# Patient Record
Sex: MALE | Race: WHITE | NOT HISPANIC OR LATINO | URBAN - METROPOLITAN AREA
[De-identification: names, ages, dates, MRNs, and addresses within clinical notes are randomized per-mention and may not be internally consistent; named-entity substitution may affect disease eponyms.]

---

## 2023-01-05 NOTE — H&P CARDIOLOGY - NSICDXPASTMEDICALHX_GEN_ALL_CORE_FT
PAST MEDICAL HISTORY:  CAD (coronary artery disease)     HLD (hyperlipidemia)     HTN (hypertension)     Paroxysmal A-fib      PAST MEDICAL HISTORY:  CAD (coronary artery disease)     Diabetes     DM (diabetes mellitus)     HLD (hyperlipidemia)     HTN (hypertension)     Paroxysmal A-fib

## 2023-01-05 NOTE — H&P CARDIOLOGY - HISTORY OF PRESENT ILLNESS
Patient is a 72 male with PMHx HLD, HTN, paroxysmal Afib (clarify anticoag), CAD (MARTHA to RCA 7/13/12) presents to cardiologist office with c/o CP with walking. CCTA 11/27/22 performed in [place], calcium score 509.  Patient presents today for [facility] with possible intervention.     Pre cath note:    indication:  [ ] STEMI                [ ] NSTEMI                 [ ] Acute coronary syndrome                     [ ]Unstable Angina   [ ] high risk  [ ] intermediate risk  [ ] low risk                     [x ] Stable Angina     non-invasive testing:              CCTA         Date:     11/27                result: [ ] high risk  [ ] intermediate risk  [ ] low risk    Anti- Anginal medications:                    [ ] not used                       [ ] used                   [ ] not used but strong indication not to use    Ejection Fraction                   [ ] <29            [ ] 30-39%   [ ] 40-49%     [ ]>50%    CHF                   [ ] active (within last 14 days on meds   [ ] Chronic (on meds but no exacerbation)    COPD                   [ ] mild (on chronic bronchodilators)  [ ] moderate (on chronic steroid therapy)      [ ] severe (indication for home O2 or PACO2 >50)    Other risk factors:                       [ ] Previous MI                     [ ] CVA/ stroke                    [ ] carotid stent/ CEA                    [ ] PVD/PAD- (arterial aneurysm, non-palpable pulses, tortuous vessel with inability to insert catheter, infra-renal dissection, renal or subclavian artery stenosis)                    [ ] diabetic                    [ ] previous CABG                    [ ] Renal Failure           Right Mohan Test:    Adjusted Cath Bleeding Risk: 1.0%    Pre-hydration: Patient is a 72 male with PMHx HLD, HTN, paroxysmal Afib now SR not on AC, DM, CAD (MARTHA to RCA 7/13/12) presents to cardiologist office with c/o CP with walking. CCTA 11/27/22 performed in [place], calcium score 509.  Patient presents today for [facility] with possible intervention.     Pre cath note:    indication:  [ ] STEMI                [ ] NSTEMI                 [ ] Acute coronary syndrome                     [ ]Unstable Angina   [ ] high risk  [ ] intermediate risk  [ ] low risk                     [x ] Stable Angina     non-invasive testing:              CCTA         Date:     11/27/22               result: [x ] high risk  [ ] intermediate risk  [ ] low risk    Anti- Anginal medications:                    [ ] not used                       [ x] used                   [ ] not used but strong indication not to use    Ejection Fraction                   [ ] <29            [ ] 30-39%   [ ] 40-49%     [ ]>50%    CHF                   [ ] active (within last 14 days on meds   [ ] Chronic (on meds but no exacerbation)    COPD                   [ ] mild (on chronic bronchodilators)  [ ] moderate (on chronic steroid therapy)      [ ] severe (indication for home O2 or PACO2 >50)    Other risk factors:                       [ ] Previous MI                     [ ] CVA/ stroke                    [ ] carotid stent/ CEA                    [ ] PVD/PAD- (arterial aneurysm, non-palpable pulses, tortuous vessel with inability to insert catheter, infra-renal dissection, renal or subclavian artery stenosis)                    [x ] diabetic                    [ ] previous CABG                    [ ] Renal Failure           Right Mohan Test: WNL    Adjusted Cath Bleeding Risk: 1.0%    Pre-hydration: 250cc NS bolus over 1 hr precath

## 2023-01-06 ENCOUNTER — OUTPATIENT (OUTPATIENT)
Dept: OUTPATIENT SERVICES | Facility: HOSPITAL | Age: 73
LOS: 1 days | Discharge: HOME | End: 2023-01-06
Payer: COMMERCIAL

## 2023-01-06 DIAGNOSIS — Z95.5 PRESENCE OF CORONARY ANGIOPLASTY IMPLANT AND GRAFT: Chronic | ICD-10-CM

## 2023-01-06 LAB
ANION GAP SERPL CALC-SCNC: 11 MMOL/L — SIGNIFICANT CHANGE UP (ref 7–14)
BUN SERPL-MCNC: 14 MG/DL — SIGNIFICANT CHANGE UP (ref 10–20)
CALCIUM SERPL-MCNC: 10.1 MG/DL — SIGNIFICANT CHANGE UP (ref 8.4–10.5)
CHLORIDE SERPL-SCNC: 102 MMOL/L — SIGNIFICANT CHANGE UP (ref 98–110)
CO2 SERPL-SCNC: 26 MMOL/L — SIGNIFICANT CHANGE UP (ref 17–32)
CREAT SERPL-MCNC: 1 MG/DL — SIGNIFICANT CHANGE UP (ref 0.7–1.5)
EGFR: 81 ML/MIN/1.73M2 — SIGNIFICANT CHANGE UP
GLUCOSE BLDC GLUCOMTR-MCNC: 105 MG/DL — HIGH (ref 70–99)
GLUCOSE SERPL-MCNC: 112 MG/DL — HIGH (ref 70–99)
HCT VFR BLD CALC: 48.6 % — SIGNIFICANT CHANGE UP (ref 42–52)
HGB BLD-MCNC: 16.4 G/DL — SIGNIFICANT CHANGE UP (ref 14–18)
MCHC RBC-ENTMCNC: 29.3 PG — SIGNIFICANT CHANGE UP (ref 27–31)
MCHC RBC-ENTMCNC: 33.7 G/DL — SIGNIFICANT CHANGE UP (ref 32–37)
MCV RBC AUTO: 86.9 FL — SIGNIFICANT CHANGE UP (ref 80–94)
NRBC # BLD: 0 /100 WBCS — SIGNIFICANT CHANGE UP (ref 0–0)
PLATELET # BLD AUTO: 179 K/UL — SIGNIFICANT CHANGE UP (ref 130–400)
POTASSIUM SERPL-MCNC: 5.2 MMOL/L — HIGH (ref 3.5–5)
POTASSIUM SERPL-SCNC: 5.2 MMOL/L — HIGH (ref 3.5–5)
RBC # BLD: 5.59 M/UL — SIGNIFICANT CHANGE UP (ref 4.7–6.1)
RBC # FLD: 12.4 % — SIGNIFICANT CHANGE UP (ref 11.5–14.5)
SODIUM SERPL-SCNC: 139 MMOL/L — SIGNIFICANT CHANGE UP (ref 135–146)
WBC # BLD: 6.26 K/UL — SIGNIFICANT CHANGE UP (ref 4.8–10.8)
WBC # FLD AUTO: 6.26 K/UL — SIGNIFICANT CHANGE UP (ref 4.8–10.8)

## 2023-01-06 PROCEDURE — 93458 L HRT ARTERY/VENTRICLE ANGIO: CPT | Mod: 26,XU

## 2023-01-06 PROCEDURE — 92978 ENDOLUMINL IVUS OCT C 1ST: CPT | Mod: 26,RC

## 2023-01-06 PROCEDURE — 92928 PRQ TCAT PLMT NTRAC ST 1 LES: CPT | Mod: RC

## 2023-01-06 RX ORDER — CLOPIDOGREL BISULFATE 75 MG/1
1 TABLET, FILM COATED ORAL
Qty: 30 | Refills: 3
Start: 2023-01-06 | End: 2023-05-05

## 2023-01-06 RX ORDER — SODIUM CHLORIDE 9 MG/ML
1000 INJECTION INTRAMUSCULAR; INTRAVENOUS; SUBCUTANEOUS
Refills: 0 | Status: DISCONTINUED | OUTPATIENT
Start: 2023-01-06 | End: 2023-01-20

## 2023-01-06 NOTE — ASU PATIENT PROFILE, ADULT - FALL HARM RISK - UNIVERSAL INTERVENTIONS
Statement Selected Bed in lowest position, wheels locked, appropriate side rails in place/Call bell, personal items and telephone in reach/Instruct patient to call for assistance before getting out of bed or chair/Non-slip footwear when patient is out of bed/[place] to call system/Physically safe environment - no spills, clutter or unnecessary equipment/Purposeful Proactive Rounding/Room/bathroom lighting operational, light cord in reach

## 2023-01-06 NOTE — ASU PATIENT PROFILE, ADULT - NSICDXPASTMEDICALHX_GEN_ALL_CORE_FT
PAST MEDICAL HISTORY:  CAD (coronary artery disease)     Diabetes     HLD (hyperlipidemia)     HTN (hypertension)     Paroxysmal A-fib

## 2023-01-06 NOTE — PROGRESS NOTE ADULT - SUBJECTIVE AND OBJECTIVE BOX
Cardiology Follow up s/p PCI MARTHA    RAHUL VICTORIA   72y Male  PAST MEDICAL & SURGICAL HISTORY:    HLD (hyperlipidemia)    HTN (hypertension)    Paroxysmal A-fib    CAD (coronary artery disease)    Diabetes    DM (diabetes mellitus)    S/P right coronary artery (RCA) stent placement         HPI:  Patient is a 72 male with PMHx HLD, HTN, paroxysmal Afib now SR not on AC, DM, CAD (MARTHA to RCA 7/13/12) presents to cardiologist office with c/o CP with walking. CCTA 11/27/22 performed in [place], calcium score 509.  Patient presents today for [facility] with possible intervention.     Allergies    No Known Allergies      HR: 56  BP: 128/75  RR: 12  SpO2: 98% on RA       MEDICATIONS  (STANDING):  sodium chloride 0.9%. 1000 milliLiter(s) (150 mL/Hr) IV Continuous <Continuous>    REVIEW OF SYSTEMS:          All negative except as mentioned in HPI    PHYSICAL EXAM:           CONSTITUTIONAL: Well-developed; well-nourished; in no acute distress  	SKIN: warm, dry  	HEAD: Normocephalic; atraumatic  	EYES: PERRL.  	ENT: No nasal discharge, airway clear, mucous membranes moist  	NECK: Supple; non tender.  	CARD: +S1, +S2, no murmurs, gallops, or rubs. Regular rate and rhythm    	RESP: No wheezes, rales or rhonchi. CTA B/L  	ABD: soft ntnd, + BS x 4 quadrants  	EXT: moves all extremities,  no clubbing, cyanosis or edema  	NEURO: Alert and oriented x3, no focal deficits          PSYCH: Cooperative, appropriate          VASCULAR:  + Rad / + PTs / +  DPs          EXTREMITY:              Right Radial: Dressing D/C/I, access site soft, no hematoma, no pain, + pulses, no sign of infection, no numbness            ECG:   P    LABS:                        16.4   6.26  )-----------( 179      ( 06 Jan 2023 10:50 )             48.6     01-06    139  |  102  |  14  ----------------------------<  112<H>  5.2<H>   |  26  |  1.0    Ca    10.1      06 Jan 2023 10:50    A/P:  I discussed the case with Cardiologist Dr. Khan and recommend the following:    S/P PCI:   IV Contrast: 150 mL      Intervention: successful balloon angioplasty and DESx1 to ostial RPDA; IVUS     Implants: [place] 2.5x12 (AUC 7)     FINDINGS:     Coronary Dominance: Right     LM: Normal     LAD: mild disease   D1: mild disease     Prox Cx: 80% ostial stenosis   Distal Cx: moderate disease   OM1: mild disease     Prox RCA: mild disease  Mid RCA: mild disease  Distal RCA: mild disease with patent stent  RPDA: 90% ostial stenosis      LVEDP: 16mmHg     EF: 40%                         CBC/ECG at 5 pm                    NS 150cc/hr x 6hr                   Hold Metformin for 48 hr after Cardiac Cath   	     Continue DAPT ( Aspirin 81 mg daily and Plavix 75 mg daily ), ACEi, B-Blocker, Statin Therapy                   Patient given 30 day supply of ( Aspirin 81 mg daily and Plavix 75 mg daily ) to take at home                   Patient agreeing to take DAPT for at least one year or as directed by cardiologist                    Pt given instructions on importance of taking antiplatelet medication or risk acute stent thrombosis/death                   Post cath instructions, access site care and activity restrictions reviewed with patient                     Discussed with patient to return to hospital if experience chest pain, shortness breath, dizziness and site bleeding                   Aggressive risk factor modification, diet counseling, smoking cessation discussed with patient                       Can discharge patient from cardiac standpoint at 7 pm after ambulating without symptoms and access site wnl, ECG and blood work reviewed                    Benefits of Cardiac Rehab discussed with patient, All documents sent to [facility]. Patient instructed to call and make first                               appointment after first f/u visit with Cardiologist                    Stage PCI to ostial Cx in 4-6 weeks                    Follow up with Cardiology Dr. Khan in two weeks. Instructed to call and make an appointment

## 2023-01-06 NOTE — CHART NOTE - NSCHARTNOTEFT_GEN_A_CORE
PRE-OP DIAGNOSIS:  (+) CCTA    PROCEDURE:     [x] Coronary Angiogram     [x] LHC     [] LVG     [] RHC     [x] Intervention (see below)         PHYSICIAN: Dr. Khan     ASSISTANT: Dr. YI Coronado        PROCEDURE DESCRIPTION:     Consent:      [x] Patient     [] Family Member     []  Used        Anesthesia:     [] General     [x Sedation     [x] Local        Access & Closure:     [x] 6Fr Right Radial Artery; D-STAT     [] Fr Femoral Artery     [] Fr Femoral Vein     [] Fr Brachial Vein       IV Contrast: mL        Intervention:       Implants:        FINDINGS:     Coronary Dominance: Right       LM: Normal     LAD: Normal     CX: Normal     RCA: patent stent     LVEDP: mmHg     EF: %        ESTIMATED BLOOD LOSS: < 10 mL        CONDITION:     [x] Good     [] Fair     [] Critical        SPECIMEN REMOVED: N/A       POST-OP DIAGNOSIS:      [] Normal Coronary Angiogram     [] Mild Coronary Artery Disease (< 50% stenosis)     [] __ Vessel Coronary Artery Disease        PLAN OF CARE:     [x] D/C Home Today     [] Return to In-patient bed     [] Admit for observation     [] Return for Staged Procedure     [] CT Surgery Consult     [] Medications:   - c/w ASA  - start plavix   - c/w metoporlol and atorvasattin   - c/w home meds    [] IV Fluids:   - start NS@75 for 4 hours. ************INCOMPLETE NOTE**********************        PRE-OP DIAGNOSIS:  (+) CCTA    PROCEDURE:     [x] Coronary Angiogram     [x] LHC     [] LVG     [] RHC     [x] Intervention (see below)         PHYSICIAN: Dr. Khan     ASSISTANT: Dr. YI Coronado        PROCEDURE DESCRIPTION:     Consent:      [x] Patient     [] Family Member     []  Used        Anesthesia:     [] General     [x Sedation     [x] Local     ************INCOMPLETE NOTE*********************     Access & Closure:     [x] 6Fr Right Radial Artery; D-STAT     [] Fr Femoral Artery     [] Fr Femoral Vein     [] Fr Brachial Vein       IV Contrast: mL        Intervention:       Implants:        FINDINGS:     Coronary Dominance: Right       LM: Normal     LAD: Normal     CX: Normal     RCA: patent stent     LVEDP: mmHg     EF: %        ESTIMATED BLOOD LOSS: < 10 mL        CONDITION:     [x] Good     [] Fair     [] Critical        SPECIMEN REMOVED: N/A       POST-OP DIAGNOSIS:      [] Normal Coronary Angiogram     [] Mild Coronary Artery Disease (< 50% stenosis)     [] __ Vessel Coronary Artery Disease        PLAN OF CARE:     [x] D/C Home Today     [] Return to In-patient bed     [] Admit for observation     [] Return for Staged Procedure     [] CT Surgery Consult     [] Medications:   - c/w ASA  - start plavix   - c/w metoporlol and atorvasattin   - c/w home meds    [] IV Fluids:   - start NS@75 for 4 hours.='    ************INCOMPLETE NOTE********************* PRE-OP DIAGNOSIS:  (+) CCTA    PROCEDURE:     [x] Coronary Angiogram     [x] LHC     [] LVG     [] RHC     [x] Intervention (see below)         PHYSICIAN: Dr. Khan     ASSISTANT: Dr. Franz, Dr. YI Coronado        PROCEDURE DESCRIPTION:     Consent:      [x] Patient     [] Family Member     []  Used        Anesthesia:     [] General     [x Sedation     [x] Local     Access & Closure:     [x] 6Fr Right Radial Artery; D-STAT     [] Fr Femoral Artery     [] Fr Femoral Vein     [] Fr Brachial Vein       IV Contrast: 150 mL        Intervention: successful balloon angioplasty and DESx1 to ostial RPDA       Implants: [place] 2.5x12 (AUC 7)       FINDINGS:     Coronary Dominance: Right       LM: Normal     LAD: mild disease   D1: mild disease     Prox Cx: 80% ostial stenosis   Distal Cx: moderate disease   OM1: mild disease     Prox RCA: mild disease  Mid RCA: mild disease  Distal RCA: mild disease with patent stent  RPDA: 90% ostial stenosis      LVEDP: 16mmHg     EF: 40%        ESTIMATED BLOOD LOSS: < 10 mL        CONDITION:     [x] Good     [] Fair     [] Critical        SPECIMEN REMOVED: N/A       POST-OP DIAGNOSIS:      [] Normal Coronary Angiogram     [] Mild Coronary Artery Disease (< 50% stenosis)     [x] _2_ Vessel Coronary Artery Disease (RPDA and ostial Cx)       PLAN OF CARE:     [x] D/C Home Today     [] Return to In-patient bed     [] Admit for observation     [] Return for Staged Procedure     [] CT Surgery Consult     [] Medications:   - c/w ASA  - start plavix   - c/w Metoprolol and Atorvastatin   - c/w home meds    [] IV Fluids:   - start NS@150 for 4 hours PRE-OP DIAGNOSIS:  (+) CCTA    PROCEDURE:     [x] Coronary Angiogram     [x] LHC     [] LVG     [] RHC     [x] Intervention (see below)         PHYSICIAN: Dr. Khan     ASSISTANT: Dr. Franz, Dr. YI Coronado        PROCEDURE DESCRIPTION:     Consent:      [x] Patient     [] Family Member     []  Used        Anesthesia:     [] General     [x Sedation     [x] Local     Access & Closure:     [x] 6Fr Right Radial Artery; D-STAT     [] Fr Femoral Artery     [] Fr Femoral Vein     [] Fr Brachial Vein       IV Contrast: 150 mL        Intervention: successful balloon angioplasty and DESx1 to ostial RPDA; IVUS       Implants: [place] 2.5x12 (AUC 7)       FINDINGS:     Coronary Dominance: Right       LM: Normal     LAD: mild disease   D1: mild disease     Prox Cx: 80% ostial stenosis   Distal Cx: moderate disease   OM1: mild disease     Prox RCA: mild disease  Mid RCA: mild disease  Distal RCA: mild disease with patent stent  RPDA: 90% ostial stenosis      LVEDP: 16mmHg     EF: 40%        ESTIMATED BLOOD LOSS: < 10 mL        CONDITION:     [x] Good     [] Fair     [] Critical        SPECIMEN REMOVED: N/A       POST-OP DIAGNOSIS:      [] Normal Coronary Angiogram     [] Mild Coronary Artery Disease (< 50% stenosis)     [x] _2_ Vessel Coronary Artery Disease (RPDA and ostial Cx)       PLAN OF CARE:     [x] D/C Home Today     [] Return to In-patient bed     [] Admit for observation     [] Return for Staged Procedure     [] CT Surgery Consult     [] Medications:   - c/w ASA  - start plavix   - c/w Metoprolol and Atorvastatin   - c/w home meds  - stage PCI to ostial Cx in 4-6 weeks     [] IV Fluids:   - start NS@150 for 4 hours PRE-OP DIAGNOSIS:  (+) CCTA    PROCEDURE:     [x] Coronary Angiogram     [x] LHC     [x] LVG     [] RHC     [x] Intervention (see below)         PHYSICIAN: Dr. Khan     ASSISTANT: Dr. Franz, Dr. YI Coronado        PROCEDURE DESCRIPTION:     Consent:      [x] Patient     [] Family Member     []  Used        Anesthesia:     [] General     [x Sedation     [x] Local     Access & Closure:     [x] 6Fr Right Radial Artery; D-STAT     [] Fr Femoral Artery     [] Fr Femoral Vein     [] Fr Brachial Vein       IV Contrast: 150 mL        Intervention: successful balloon angioplasty and DESx1 to ostial RPDA; IVUS       Implants: [place] 2.5x12 (AUC 7)       FINDINGS:     Coronary Dominance: Right       LM: Normal     LAD: mild disease   D1: mild disease     Prox Cx: 80% ostial stenosis   Distal Cx: moderate disease   OM1: mild disease     Prox RCA: mild disease  Mid RCA: mild disease  Distal RCA: mild disease with patent stent  RPDA: 90% ostial stenosis      LVEDP: 16mmHg     EF: 55%        ESTIMATED BLOOD LOSS: < 10 mL        CONDITION:     [x] Good     [] Fair     [] Critical        SPECIMEN REMOVED: N/A       POST-OP DIAGNOSIS:      [] Normal Coronary Angiogram     [] Mild Coronary Artery Disease (< 50% stenosis)     [x] _2_ Vessel Coronary Artery Disease (RPDA and ostial Cx)       PLAN OF CARE:     [x] D/C Home Today     [] Return to In-patient bed     [] Admit for observation     [] Return for Staged Procedure     [] CT Surgery Consult     [] Medications:   - c/w ASA  - start plavix   - c/w Metoprolol and Atorvastatin   - c/w home meds  - stage PCI to ostial Cx in 4-6 weeks     [] IV Fluids:   - start NS@150 for 4 hours PRE-OP DIAGNOSIS:  (+) CCTA    PROCEDURE:     [x] Coronary Angiogram     [x] LHC     [x] LVG     [] RHC     [x] Intervention (see below)         PHYSICIAN: Dr. Khan     ASSISTANT: Dr. Franz, Dr. YI Coronado        PROCEDURE DESCRIPTION:     Consent:      [x] Patient     [] Family Member     []  Used        Anesthesia:     [] General     [x Sedation     [x] Local     Access & Closure:     [x] 6Fr Right Radial Artery; D-STAT     [] Fr Femoral Artery     [] Fr Femoral Vein     [] Fr Brachial Vein       IV Contrast: 150 mL        Intervention: successful balloon angioplasty and DESx1 to ostial RPDA; IVUS       Implants: [place] 2.5x12 (AUC 7)       FINDINGS:     Coronary Dominance: Right       LM: Normal     LAD: mild disease   D1: mild disease     Prox Cx: 80% ostial stenosis   Distal Cx: moderate disease   OM1: mild disease     Prox RCA: mild disease  Mid RCA: mild disease  Distal RCA: mild disease   RPDA: 90% ostial stenosis    RPL: patent stent     LVEDP: 16mmHg     EF: 55%        ESTIMATED BLOOD LOSS: < 10 mL        CONDITION:     [x] Good     [] Fair     [] Critical        SPECIMEN REMOVED: N/A       POST-OP DIAGNOSIS:      [x] _2_ Vessel Coronary Artery Disease (RPDA and ostial Cx)       PLAN OF CARE:     [x] D/C Home Today        [] Medications:   - c/w ASA  - start plavix   - c/w Metoprolol and Atorvastatin   - c/w home meds  - stage PCI to ostial Cx in 4-6 weeks     [] IV Fluids:   - start NS@150 for 4 hours

## 2023-01-17 DIAGNOSIS — Z82.49 FAMILY HISTORY OF ISCHEMIC HEART DISEASE AND OTHER DISEASES OF THE CIRCULATORY SYSTEM: ICD-10-CM

## 2023-01-17 DIAGNOSIS — I10 ESSENTIAL (PRIMARY) HYPERTENSION: ICD-10-CM

## 2023-01-17 DIAGNOSIS — Z87.891 PERSONAL HISTORY OF NICOTINE DEPENDENCE: ICD-10-CM

## 2023-01-17 DIAGNOSIS — I20.8 OTHER FORMS OF ANGINA PECTORIS: ICD-10-CM

## 2023-01-17 DIAGNOSIS — Z95.5 PRESENCE OF CORONARY ANGIOPLASTY IMPLANT AND GRAFT: ICD-10-CM

## 2023-01-17 DIAGNOSIS — I48.0 PAROXYSMAL ATRIAL FIBRILLATION: ICD-10-CM

## 2023-01-17 DIAGNOSIS — Z79.82 LONG TERM (CURRENT) USE OF ASPIRIN: ICD-10-CM

## 2023-01-17 DIAGNOSIS — Z79.84 LONG TERM (CURRENT) USE OF ORAL HYPOGLYCEMIC DRUGS: ICD-10-CM

## 2023-01-17 DIAGNOSIS — E78.5 HYPERLIPIDEMIA, UNSPECIFIED: ICD-10-CM

## 2023-01-17 DIAGNOSIS — I25.118 ATHEROSCLEROTIC HEART DISEASE OF NATIVE CORONARY ARTERY WITH OTHER FORMS OF ANGINA PECTORIS: ICD-10-CM

## 2023-04-21 PROBLEM — E11.9 TYPE 2 DIABETES MELLITUS WITHOUT COMPLICATIONS: Chronic | Status: ACTIVE | Noted: 2023-01-06

## 2023-04-21 PROBLEM — E78.5 HYPERLIPIDEMIA, UNSPECIFIED: Chronic | Status: ACTIVE | Noted: 2023-01-05

## 2023-04-21 PROBLEM — I25.10 ATHEROSCLEROTIC HEART DISEASE OF NATIVE CORONARY ARTERY WITHOUT ANGINA PECTORIS: Chronic | Status: ACTIVE | Noted: 2023-01-05

## 2023-04-21 PROBLEM — I48.0 PAROXYSMAL ATRIAL FIBRILLATION: Chronic | Status: ACTIVE | Noted: 2023-01-05

## 2023-04-21 PROBLEM — I10 ESSENTIAL (PRIMARY) HYPERTENSION: Chronic | Status: ACTIVE | Noted: 2023-01-05

## 2023-04-27 VITALS — SYSTOLIC BLOOD PRESSURE: 142 MMHG | OXYGEN SATURATION: 96 % | HEART RATE: 54 BPM | DIASTOLIC BLOOD PRESSURE: 75 MMHG

## 2023-04-27 NOTE — H&P CARDIOLOGY - NSICDXPASTMEDICALHX_GEN_ALL_CORE_FT
PAST MEDICAL HISTORY:  CAD (coronary artery disease)     Diabetes     DM (diabetes mellitus)     HLD (hyperlipidemia)     HTN (hypertension)     Paroxysmal A-fib      PAST MEDICAL HISTORY:  2019 novel coronavirus disease (COVID-19)     CAD (coronary artery disease)     Diabetes     DM (diabetes mellitus)     HLD (hyperlipidemia)     HTN (hypertension)     Paroxysmal A-fib

## 2023-04-27 NOTE — H&P CARDIOLOGY - HISTORY OF PRESENT ILLNESS
71 y/o male with PMHx HLD, HTN, paroxysmal Afib (now SR, not on AC), DM-II, CAD with prior PCI/MARTHA to RCA 7/13/12 and recent PCI/MARTHA ostial RPDA on 1/6/23 (EF 55%), with residual pCx 80% stenosis, who now presents for recommended staged PCI of pCx.    Pt initially presented to cardiologist office with c/o CP with walking.  CCTA 11/27/22 performed in [place], calcium score 509.      Pre cath note:    indication:  [ ] STEMI                [ ] NSTEMI                 [ ] Acute coronary syndrome                     [ ]Unstable Angina   [ ] high risk  [ ] intermediate risk  [ ] low risk                     [ ] Stable Angina     non-invasive testing:                          Date:                     result: [ ] high risk  [ ] intermediate risk  [ ] low risk    Anti- Anginal medications:                    [ ] not used                       [ ] used                   [ ] not used but strong indication not to use    Ejection Fraction                   [ ] <29            [ ] 30-39%   [ ] 40-49%     [ ]>50%    CHF                   [ ] active (within last 14 days on meds   [ ] Chronic (on meds but no exacerbation)    COPD                   [ ] mild (on chronic bronchodilators)  [ ] moderate (on chronic steroid therapy)      [ ] severe (indication for home O2 or PACO2 >50)    Other risk factors:                       [ ] Previous MI                     [ ] CVA/ stroke                    [ ] carotid stent/ CEA                    [ ] PVD/PAD- (arterial aneurysm, non-palpable pulses, tortuous vessel with inability to insert catheter, infra-renal dissection, renal or subclavian artery stenosis)                    [x ] diabetic                    [ ] previous CABG                    [ ] Renal Failure       Right Mohan Test:    Adjusted Cath Bleeding Risk: 0.9%    Pre-hydration:      71 y/o male with PMHx HLD, HTN, paroxysmal Afib (now SR, not on AC), DM-II, CAD with prior PCI/MARTHA to RCA 7/13/12 and recent PCI/MARTHA ostial RPDA on 1/6/23 (EF 55%), with residual pCx 80% stenosis, who now presents for recommended staged PCI of pCx.    Pt initially presented to cardiologist office with c/o CP with walking.  CCTA 11/27/22 performed in [place], calcium score 509.      Pre cath note:    indication:  [ ] STEMI                [ ] NSTEMI                 [ ] Acute coronary syndrome                     [ ]Unstable Angina   [ ] high risk  [ ] intermediate risk  [ ] low risk                     [ x] Stable Angina     non-invasive testing:   CCTA                       Date:    11/27/22                 result: [ ] high risk  [ x] intermediate risk  [ ] low risk                   (x) staged PCI LCX  Anti- Anginal medications:                    [ ] not used                       [ ] used                   [ ] not used but strong indication not to use    Ejection Fraction                   [ ] <29            [ ] 30-39%   [ ] 40-49%     [x ]>50%    CHF                   [ ] active (within last 14 days on meds   [ ] Chronic (on meds but no exacerbation)    COPD                   [ ] mild (on chronic bronchodilators)  [ ] moderate (on chronic steroid therapy)      [ ] severe (indication for home O2 or PACO2 >50)    Other risk factors:                       [ ] Previous MI                     [ ] CVA/ stroke                    [ ] carotid stent/ CEA                    [ ] PVD/PAD- (arterial aneurysm, non-palpable pulses, tortuous vessel with inability to insert catheter, infra-renal dissection, renal or subclavian artery stenosis)                    [x ] diabetic                    [ ] previous CABG                    [ ] Renal Failure       Right Mohan Test:  Adjusted Cath Bleeding Risk: 0.9%  Pre-hydration: NS 250cc IV bolus x 1 over 1 hr  EKG:   EF: 55% on 1/6/23     73 y/o male with PMHx HLD, HTN, paroxysmal Afib (now SR, not on AC), DM-II, CAD( 3 stents total) with prior PCI/MARTHA to RCA 7/13/12 and recent PCI/MARTHA ostial RPDA on 1/6/23 (EF 55%), with residual pCx 80% stenosis, who now presents for recommended staged PCI of pCx.    Pt initially presented to cardiologist office with c/o CP with walking.  CCTA 11/27/22 performed in [place], calcium score 509. Pt still reports episodes of chest pain when ambulating quickly.     Pre cath note:    indication:  [ ] STEMI                [ ] NSTEMI                 [ ] Acute coronary syndrome                     [ ]Unstable Angina   [ ] high risk  [ ] intermediate risk  [ ] low risk                     [ x] Stable Angina     non-invasive testing:   CCTA                       Date:    11/27/22                 result: [ ] high risk  [ x] intermediate risk  [ ] low risk                   (x) staged PCI LCX  Anti- Anginal medications:                    [ ] not used                       [ x] used                   [ ] not used but strong indication not to use    Ejection Fraction                   [ ] <29            [ ] 30-39%   [ ] 40-49%     [x ]>50%    CHF                   [ ] active (within last 14 days on meds   [ ] Chronic (on meds but no exacerbation)    COPD                   [ ] mild (on chronic bronchodilators)  [ ] moderate (on chronic steroid therapy)      [ ] severe (indication for home O2 or PACO2 >50)    Other risk factors:                       [ ] Previous MI                     [ ] CVA/ stroke                    [ ] carotid stent/ CEA                    [ ] PVD/PAD- (arterial aneurysm, non-palpable pulses, tortuous vessel with inability to insert catheter, infra-renal dissection, renal or subclavian artery stenosis)                    [x ] diabetic                    [ ] previous CABG                    [ ] Renal Failure       Right Mohan Test: Positive   Adjusted Cath Bleeding Risk: 0.9%  Pre-hydration: NS 250cc IV bolus x 1 over 1 hr  EKG: PATRICIA Umana on 4/28/23  EF: 55% on 1/6/23

## 2023-04-28 ENCOUNTER — OUTPATIENT (OUTPATIENT)
Dept: INPATIENT UNIT | Facility: HOSPITAL | Age: 73
LOS: 1 days | Discharge: HOME | End: 2023-04-28
Payer: COMMERCIAL

## 2023-04-28 DIAGNOSIS — I20.0 UNSTABLE ANGINA: ICD-10-CM

## 2023-04-28 DIAGNOSIS — Z98.890 OTHER SPECIFIED POSTPROCEDURAL STATES: Chronic | ICD-10-CM

## 2023-04-28 LAB
ANION GAP SERPL CALC-SCNC: 11 MMOL/L — SIGNIFICANT CHANGE UP (ref 7–14)
BUN SERPL-MCNC: 19 MG/DL — SIGNIFICANT CHANGE UP (ref 10–20)
CALCIUM SERPL-MCNC: 9.4 MG/DL — SIGNIFICANT CHANGE UP (ref 8.4–10.5)
CHLORIDE SERPL-SCNC: 102 MMOL/L — SIGNIFICANT CHANGE UP (ref 98–110)
CO2 SERPL-SCNC: 28 MMOL/L — SIGNIFICANT CHANGE UP (ref 17–32)
CREAT SERPL-MCNC: 0.8 MG/DL — SIGNIFICANT CHANGE UP (ref 0.7–1.5)
EGFR: 95 ML/MIN/1.73M2 — SIGNIFICANT CHANGE UP
GLUCOSE BLDC GLUCOMTR-MCNC: 118 MG/DL — HIGH (ref 70–99)
GLUCOSE SERPL-MCNC: 129 MG/DL — HIGH (ref 70–99)
HCT VFR BLD CALC: 43.9 % — SIGNIFICANT CHANGE UP (ref 42–52)
HCT VFR BLD CALC: 45.5 % — SIGNIFICANT CHANGE UP (ref 42–52)
HGB BLD-MCNC: 14.8 G/DL — SIGNIFICANT CHANGE UP (ref 14–18)
HGB BLD-MCNC: 14.9 G/DL — SIGNIFICANT CHANGE UP (ref 14–18)
MCHC RBC-ENTMCNC: 29.2 PG — SIGNIFICANT CHANGE UP (ref 27–31)
MCHC RBC-ENTMCNC: 29.5 PG — SIGNIFICANT CHANGE UP (ref 27–31)
MCHC RBC-ENTMCNC: 32.7 G/DL — SIGNIFICANT CHANGE UP (ref 32–37)
MCHC RBC-ENTMCNC: 33.7 G/DL — SIGNIFICANT CHANGE UP (ref 32–37)
MCV RBC AUTO: 87.6 FL — SIGNIFICANT CHANGE UP (ref 80–94)
MCV RBC AUTO: 89.2 FL — SIGNIFICANT CHANGE UP (ref 80–94)
NRBC # BLD: 0 /100 WBCS — SIGNIFICANT CHANGE UP (ref 0–0)
NRBC # BLD: 0 /100 WBCS — SIGNIFICANT CHANGE UP (ref 0–0)
PLATELET # BLD AUTO: 152 K/UL — SIGNIFICANT CHANGE UP (ref 130–400)
PLATELET # BLD AUTO: 172 K/UL — SIGNIFICANT CHANGE UP (ref 130–400)
PMV BLD: 11 FL — HIGH (ref 7.4–10.4)
PMV BLD: 11.3 FL — HIGH (ref 7.4–10.4)
POTASSIUM SERPL-MCNC: 4.5 MMOL/L — SIGNIFICANT CHANGE UP (ref 3.5–5)
POTASSIUM SERPL-SCNC: 4.5 MMOL/L — SIGNIFICANT CHANGE UP (ref 3.5–5)
RBC # BLD: 5.01 M/UL — SIGNIFICANT CHANGE UP (ref 4.7–6.1)
RBC # BLD: 5.1 M/UL — SIGNIFICANT CHANGE UP (ref 4.7–6.1)
RBC # FLD: 12.6 % — SIGNIFICANT CHANGE UP (ref 11.5–14.5)
RBC # FLD: 12.7 % — SIGNIFICANT CHANGE UP (ref 11.5–14.5)
SODIUM SERPL-SCNC: 141 MMOL/L — SIGNIFICANT CHANGE UP (ref 135–146)
WBC # BLD: 5.15 K/UL — SIGNIFICANT CHANGE UP (ref 4.8–10.8)
WBC # BLD: 5.35 K/UL — SIGNIFICANT CHANGE UP (ref 4.8–10.8)
WBC # FLD AUTO: 5.15 K/UL — SIGNIFICANT CHANGE UP (ref 4.8–10.8)
WBC # FLD AUTO: 5.35 K/UL — SIGNIFICANT CHANGE UP (ref 4.8–10.8)

## 2023-04-28 PROCEDURE — C1887: CPT

## 2023-04-28 PROCEDURE — 93010 ELECTROCARDIOGRAM REPORT: CPT | Mod: 77

## 2023-04-28 PROCEDURE — 80048 BASIC METABOLIC PNL TOTAL CA: CPT

## 2023-04-28 PROCEDURE — 36415 COLL VENOUS BLD VENIPUNCTURE: CPT

## 2023-04-28 PROCEDURE — 92979 ENDOLUMINL IVUS OCT C EA: CPT | Mod: LD

## 2023-04-28 PROCEDURE — 93458 L HRT ARTERY/VENTRICLE ANGIO: CPT | Mod: 26,XU

## 2023-04-28 PROCEDURE — 93010 ELECTROCARDIOGRAM REPORT: CPT

## 2023-04-28 PROCEDURE — C1753: CPT

## 2023-04-28 PROCEDURE — C1725: CPT

## 2023-04-28 PROCEDURE — 82962 GLUCOSE BLOOD TEST: CPT

## 2023-04-28 PROCEDURE — 92928 PRQ TCAT PLMT NTRAC ST 1 LES: CPT | Mod: LC

## 2023-04-28 PROCEDURE — 92979 ENDOLUMINL IVUS OCT C EA: CPT | Mod: 26,LC

## 2023-04-28 PROCEDURE — 85027 COMPLETE CBC AUTOMATED: CPT

## 2023-04-28 PROCEDURE — 92978 ENDOLUMINL IVUS OCT C 1ST: CPT | Mod: 26,LD

## 2023-04-28 PROCEDURE — C1769: CPT

## 2023-04-28 PROCEDURE — 92978 ENDOLUMINL IVUS OCT C 1ST: CPT | Mod: LC

## 2023-04-28 PROCEDURE — 93005 ELECTROCARDIOGRAM TRACING: CPT

## 2023-04-28 PROCEDURE — C1894: CPT

## 2023-04-28 PROCEDURE — C1874: CPT

## 2023-04-28 RX ORDER — METOPROLOL TARTRATE 50 MG
1 TABLET ORAL
Qty: 0 | Refills: 0 | DISCHARGE

## 2023-04-28 RX ORDER — ICOSAPENT ETHYL 500 MG/1
2 CAPSULE, LIQUID FILLED ORAL
Qty: 0 | Refills: 0 | DISCHARGE

## 2023-04-28 RX ORDER — METFORMIN HYDROCHLORIDE 850 MG/1
1 TABLET ORAL
Qty: 0 | Refills: 0 | DISCHARGE

## 2023-04-28 RX ORDER — ROSUVASTATIN CALCIUM 5 MG/1
1 TABLET ORAL
Qty: 0 | Refills: 0 | DISCHARGE

## 2023-04-28 RX ORDER — LISINOPRIL 2.5 MG/1
1 TABLET ORAL
Qty: 0 | Refills: 0 | DISCHARGE

## 2023-04-28 RX ORDER — ASPIRIN/CALCIUM CARB/MAGNESIUM 324 MG
1 TABLET ORAL
Qty: 0 | Refills: 0 | DISCHARGE

## 2023-04-28 RX ORDER — EMPAGLIFLOZIN 10 MG/1
1 TABLET, FILM COATED ORAL
Qty: 0 | Refills: 0 | DISCHARGE

## 2023-04-28 RX ORDER — PANTOPRAZOLE SODIUM 20 MG/1
1 TABLET, DELAYED RELEASE ORAL
Qty: 30 | Refills: 0
Start: 2023-04-28 | End: 2023-05-27

## 2023-04-28 NOTE — ASU PATIENT PROFILE, ADULT - NSICDXPASTMEDICALHX_GEN_ALL_CORE_FT
PAST MEDICAL HISTORY:  2019 novel coronavirus disease (COVID-19)     CAD (coronary artery disease)     Diabetes     DM (diabetes mellitus)     HLD (hyperlipidemia)     HTN (hypertension)     Paroxysmal A-fib

## 2023-04-28 NOTE — PROGRESS NOTE ADULT - SUBJECTIVE AND OBJECTIVE BOX
Cardiology Follow up    RAHUL VICTORIA   72y Male  PAST MEDICAL & SURGICAL HISTORY:  HLD (hyperlipidemia)      HTN (hypertension)      Paroxysmal A-fib      CAD (coronary artery disease)      Diabetes      DM (diabetes mellitus)      2019 novel coronavirus disease (COVID-19)      History of percutaneous angioplasty           HPI:    71 y/o male with PMHx HLD, HTN, paroxysmal Afib (now SR, not on AC), DM-II, CAD( 3 stents total) with prior PCI/MARTHA to RCA 7/13/12 and recent PCI/MARTHA ostial RPDA on 1/6/23 (EF 55%), with residual pCx 80% stenosis, who now presents for recommended staged PCI of pCx.    Pt initially presented to cardiologist office with c/o CP with walking.  CCTA 11/27/22 performed in [place], calcium score 509. Pt still reports episodes of chest pain when ambulating quickly.     Pre cath note:    indication:  [ ] STEMI                [ ] NSTEMI                 [ ] Acute coronary syndrome                     [ ]Unstable Angina   [ ] high risk  [ ] intermediate risk  [ ] low risk                     [ x] Stable Angina     non-invasive testing:   CCTA                       Date:    11/27/22                 result: [ ] high risk  [ x] intermediate risk  [ ] low risk                   (x) staged PCI LCX  Anti- Anginal medications:                    [ ] not used                       [ x] used                   [ ] not used but strong indication not to use    Ejection Fraction                   [ ] <29            [ ] 30-39%   [ ] 40-49%     [x ]>50%    CHF                   [ ] active (within last 14 days on meds   [ ] Chronic (on meds but no exacerbation)    COPD                   [ ] mild (on chronic bronchodilators)  [ ] moderate (on chronic steroid therapy)      [ ] severe (indication for home O2 or PACO2 >50)    Other risk factors:                       [ ] Previous MI                     [ ] CVA/ stroke                    [ ] carotid stent/ CEA                    [ ] PVD/PAD- (arterial aneurysm, non-palpable pulses, tortuous vessel with inability to insert catheter, infra-renal dissection, renal or subclavian artery stenosis)                    [x ] diabetic                    [ ] previous CABG                    [ ] Renal Failure       Right Mohan Test: Positive   Adjusted Cath Bleeding Risk: 0.9%  Pre-hydration: NS 250cc IV bolus x 1 over 1 hr  EKG: PATRICIA Uamna on 4/28/23  EF: 55% on 1/6/23   (27 Apr 2023 15:55)    Allergies    No Known Allergies    Intolerances      Patient seen and examined at bedside.   Patient without complaints.   Denies CP, SOB, palpitations, or dizziness    Vital Signs   HR: 56  BP: 147/79  SpO2: 97% on RA    Parameters below as of 27 Apr 2023 15:55  Patient On (Oxygen Delivery Method): room air        MEDICATIONS  (STANDING):    MEDICATIONS  (PRN):      REVIEW OF SYSTEMS:          All negative except as mentioned in HPI    PHYSICAL EXAM:           CONSTITUTIONAL: Well-developed; well-nourished; in no acute distress  	SKIN: warm, dry  	HEAD: Normocephalic; atraumatic  	EYES: PERRL.  	ENT: No nasal discharge, airway clear, mucous membranes moist  	NECK: Supple; non tender.  	CARD: +S1, +S2, no murmurs, gallops, or rubs. Regular rate and rhythm    	RESP: No wheezes, rales or rhonchi. CTA B/L  	ABD: soft ntnd, + BS x 4 quadrants  	EXT: moves all extremities,  no clubbing, cyanosis or edema  	NEURO: Alert and oriented x3, no focal deficits          PSYCH: Cooperative, appropriate          VASCULAR:  + Rad / + PTs / + DPs          EXTREMITY:                Right Radial: Dressing D/C/I,  access site soft, no hematoma, no pain, + pulses, no sign of infection, no numbness            ECG: pending    LABS:                        14.9   5.35  )-----------( 172      ( 28 Apr 2023 09:10 )             45.5     04-28    141  |  102  |  19  ----------------------------<  129<H>  4.5   |  28  |  0.8    Ca    9.4      28 Apr 2023 09:10    A/P:  I discussed the case with Cardiologist Dr. Khan and recommend the following:    S/P PCI:    MARTHA x1 LCx    	     Continue DAPT ( Aspirin 81 mg PO Daily and Plavix 75mg po daily  ),  B-Blocker, Statin Therapy                   Patient given 30 day supply of ( Aspirin 81 mg daily and Plavix 75 mg daily ) to take at home                   NO ACEi at this time, BP low during case, will reassess as outpatient                   HOLD Metformin x48hrs prior to cardiac cath                   **GI prophylaxis: START Protonix 40mg po daily                   CBC at 1330                    EKG prior to d/c 1330                   NS at 100 ml/hr x 6hrs                   OOB Ambulate                   Monitor access site/ distal pulses                   Patient agreeing to take DAPT for at least one year or as directed by cardiologist                    Pt given instructions on importance of taking antiplatelet medication or risk acute stent thrombosis/death                   Post cath instructions, access site care and activity restrictions reviewed with patient                     Discussed with patient to return to hospital if experience chest pain, shortness breath, dizziness and site bleeding                   Aggressive risk factor modification, diet counseling, smoking cessation discussed with patient                    Benefits of cardiac rehab discussed with patient and all documents sent to cardiac rehab center                   Patient instructed to call cardiac rehab and make initial appointment after first follow-up visit with Cardiologist                    Can discharge patient at 1530 from cardiac standpoint after ambulating without symptoms, access site wnl, labs and ECG reviewed                    Follow up with Cardiology Dr. Khan  in two weeks.  Instructed to call and make an appointment

## 2023-04-28 NOTE — CHART NOTE - NSCHARTNOTEFT_GEN_A_CORE
PRE-OP DIAGNOSIS:    Staged PCI  UA    PROCEDURE:     [x] Coronary Angiogram   [x] LHC    [x] Intervention (see below)         PHYSICIAN: Dr. Khan     ASSISTANT:  Dr. Horne        PROCEDURE DESCRIPTION:     Consent:      [x] Patient     [] Family Member     []  Used        Anesthesia:     [] General     [x] Sedation   [x] Local        Access & Closure:     [x]6 Fr right Radial Artery >TR Band        IV Contrast: 100 mL        Intervention:   Successful IVUS guided PCI with MARTHA to ostial LCX  AUC score of 7    Implants:    PATO FRONTIER RX [id number]    FINDINGS:     Coronary Dominance:   Right    LM:   no disease     LAD:   mild disease     CX:   ostial LCX 80 % lesion s/p PCI   Proximal LCX 40% lesion   Distal LCX mild disease     OM1   mild disease     RCA:   Mild disease     RPDA   patent stent     RPL   patent stent        LVEDP: 5 mmHg             ESTIMATED BLOOD LOSS: < 10 mL        CONDITION:     [x] Good     [] Fair     [] Critical        SPECIMEN REMOVED: N/A       POST-OP DIAGNOSIS:    One vessel coronary artery disease   Successful IVUS guided PCI with MARTHA to ostial LCX (PATO FRONTIER RX [id number])  AUC score of 7     PLAN OF CARE:     [x] D/C Home Today   [x] Medications: DAPT with aspirin/ Plavix, cont with Crestor and Metoprolol   [x] Glycemic control   [x] IV Fluids as ordered  [x] F/u with Cardiology as outpatient

## 2023-04-28 NOTE — ASU PATIENT PROFILE, ADULT - FALL HARM RISK - UNIVERSAL INTERVENTIONS
Bed in lowest position, wheels locked, appropriate side rails in place/Call bell, personal items and telephone in reach/Instruct patient to call for assistance before getting out of bed or chair/Non-slip footwear when patient is out of bed/[place] to call system/Physically safe environment - no spills, clutter or unnecessary equipment/Purposeful Proactive Rounding/Room/bathroom lighting operational, light cord in reach

## 2023-05-01 DIAGNOSIS — I25.118 ATHEROSCLEROTIC HEART DISEASE OF NATIVE CORONARY ARTERY WITH OTHER FORMS OF ANGINA PECTORIS: ICD-10-CM

## 2025-01-09 PROBLEM — U07.1 COVID-19: Chronic | Status: ACTIVE | Noted: 2023-04-28

## 2025-01-10 ENCOUNTER — APPOINTMENT (OUTPATIENT)
Dept: ELECTROPHYSIOLOGY | Facility: CLINIC | Age: 75
End: 2025-01-10
Payer: COMMERCIAL

## 2025-01-10 VITALS
HEART RATE: 58 BPM | WEIGHT: 220 LBS | BODY MASS INDEX: 31.5 KG/M2 | HEIGHT: 70 IN | SYSTOLIC BLOOD PRESSURE: 110 MMHG | DIASTOLIC BLOOD PRESSURE: 58 MMHG

## 2025-01-10 DIAGNOSIS — Z78.9 OTHER SPECIFIED HEALTH STATUS: ICD-10-CM

## 2025-01-10 DIAGNOSIS — Z87.891 PERSONAL HISTORY OF NICOTINE DEPENDENCE: ICD-10-CM

## 2025-01-10 DIAGNOSIS — I25.10 ATHEROSCLEROTIC HEART DISEASE OF NATIVE CORONARY ARTERY W/OUT ANGINA PECTORIS: ICD-10-CM

## 2025-01-10 DIAGNOSIS — I48.0 PAROXYSMAL ATRIAL FIBRILLATION: ICD-10-CM

## 2025-01-10 DIAGNOSIS — E78.5 HYPERLIPIDEMIA, UNSPECIFIED: ICD-10-CM

## 2025-01-10 DIAGNOSIS — I10 ESSENTIAL (PRIMARY) HYPERTENSION: ICD-10-CM

## 2025-01-10 DIAGNOSIS — Z98.61 ATHEROSCLEROTIC HEART DISEASE OF NATIVE CORONARY ARTERY W/OUT ANGINA PECTORIS: ICD-10-CM

## 2025-01-10 PROBLEM — Z00.00 ENCOUNTER FOR PREVENTIVE HEALTH EXAMINATION: Status: ACTIVE | Noted: 2025-01-10

## 2025-01-10 PROCEDURE — 93000 ELECTROCARDIOGRAM COMPLETE: CPT

## 2025-01-10 PROCEDURE — 99205 OFFICE O/P NEW HI 60 MIN: CPT | Mod: 25

## 2025-01-11 PROBLEM — Z78.9 CAFFEINE USE: Status: ACTIVE | Noted: 2025-01-10

## 2025-01-11 PROBLEM — I10 ESSENTIAL HYPERTENSION: Status: ACTIVE | Noted: 2025-01-11

## 2025-01-11 PROBLEM — Z78.9 DENIES ALCOHOL CONSUMPTION: Status: ACTIVE | Noted: 2025-01-10

## 2025-01-11 PROBLEM — I25.10 CAD S/P PERCUTANEOUS CORONARY ANGIOPLASTY: Status: ACTIVE | Noted: 2025-01-11

## 2025-01-11 PROBLEM — E78.5 DYSLIPIDEMIA: Status: ACTIVE | Noted: 2025-01-11

## 2025-01-11 PROBLEM — Z87.891 FORMER SMOKER: Status: ACTIVE | Noted: 2025-01-10

## 2025-01-11 PROBLEM — I48.0 PAROXYSMAL ATRIAL FIBRILLATION: Status: ACTIVE | Noted: 2025-01-11

## 2025-01-11 RX ORDER — PSYLLIUM HUSK 0.4 G
CAPSULE ORAL TWICE DAILY
Refills: 0 | Status: ACTIVE | COMMUNITY

## 2025-01-11 RX ORDER — EMPAGLIFLOZIN 25 MG/1
25 TABLET, FILM COATED ORAL DAILY
Refills: 0 | Status: ACTIVE | COMMUNITY

## 2025-01-11 RX ORDER — METFORMIN HYDROCHLORIDE 1000 MG/1
1000 TABLET, COATED ORAL DAILY
Refills: 0 | Status: ACTIVE | COMMUNITY

## 2025-01-12 RX ORDER — ASPIRIN 81 MG/1
81 TABLET ORAL
Refills: 0 | Status: ACTIVE | COMMUNITY

## 2025-01-12 RX ORDER — ROSUVASTATIN CALCIUM 20 MG/1
20 TABLET, FILM COATED ORAL
Qty: 90 | Refills: 1 | Status: ACTIVE | COMMUNITY

## 2025-01-12 RX ORDER — RIVAROXABAN 20 MG/1
20 TABLET, FILM COATED ORAL DAILY
Qty: 30 | Refills: 6 | Status: ACTIVE | COMMUNITY

## 2025-01-12 RX ORDER — AMIODARONE HYDROCHLORIDE 200 MG/1
200 TABLET ORAL DAILY
Qty: 30 | Refills: 0 | Status: ACTIVE | COMMUNITY

## 2025-01-12 RX ORDER — LISINOPRIL 10 MG/1
10 TABLET ORAL
Refills: 0 | Status: ACTIVE | COMMUNITY

## 2025-01-30 PROBLEM — E11.9 TYPE 2 DIABETES MELLITUS WITHOUT COMPLICATIONS: Chronic | Status: INACTIVE | Noted: 2023-01-06 | Resolved: 2025-01-29

## 2025-01-31 ENCOUNTER — TRANSCRIPTION ENCOUNTER (OUTPATIENT)
Age: 75
End: 2025-01-31

## 2025-01-31 ENCOUNTER — INPATIENT (INPATIENT)
Facility: HOSPITAL | Age: 75
LOS: 0 days | Discharge: HOME | DRG: 313 | End: 2025-01-31
Attending: INTERNAL MEDICINE | Admitting: STUDENT IN AN ORGANIZED HEALTH CARE EDUCATION/TRAINING PROGRAM
Payer: COMMERCIAL

## 2025-01-31 VITALS
WEIGHT: 218.04 LBS | TEMPERATURE: 98 F | RESPIRATION RATE: 19 BRPM | HEART RATE: 60 BPM | OXYGEN SATURATION: 99 % | DIASTOLIC BLOOD PRESSURE: 69 MMHG | SYSTOLIC BLOOD PRESSURE: 111 MMHG

## 2025-01-31 VITALS
RESPIRATION RATE: 17 BRPM | OXYGEN SATURATION: 98 % | DIASTOLIC BLOOD PRESSURE: 51 MMHG | SYSTOLIC BLOOD PRESSURE: 119 MMHG | HEART RATE: 68 BPM

## 2025-01-31 DIAGNOSIS — Z98.890 OTHER SPECIFIED POSTPROCEDURAL STATES: Chronic | ICD-10-CM

## 2025-01-31 LAB
ALBUMIN SERPL ELPH-MCNC: 4.4 G/DL — SIGNIFICANT CHANGE UP (ref 3.5–5.2)
ALP SERPL-CCNC: 51 U/L — SIGNIFICANT CHANGE UP (ref 30–115)
ALT FLD-CCNC: 14 U/L — SIGNIFICANT CHANGE UP (ref 0–41)
ANION GAP SERPL CALC-SCNC: 10 MMOL/L — SIGNIFICANT CHANGE UP (ref 7–14)
AST SERPL-CCNC: 13 U/L — SIGNIFICANT CHANGE UP (ref 0–41)
BASOPHILS # BLD AUTO: 0.02 K/UL — SIGNIFICANT CHANGE UP (ref 0–0.2)
BASOPHILS NFR BLD AUTO: 0.4 % — SIGNIFICANT CHANGE UP (ref 0–1)
BILIRUB SERPL-MCNC: 0.4 MG/DL — SIGNIFICANT CHANGE UP (ref 0.2–1.2)
BUN SERPL-MCNC: 16 MG/DL — SIGNIFICANT CHANGE UP (ref 10–20)
CALCIUM SERPL-MCNC: 9 MG/DL — SIGNIFICANT CHANGE UP (ref 8.4–10.5)
CHLORIDE SERPL-SCNC: 106 MMOL/L — SIGNIFICANT CHANGE UP (ref 98–110)
CO2 SERPL-SCNC: 24 MMOL/L — SIGNIFICANT CHANGE UP (ref 17–32)
CREAT SERPL-MCNC: 0.8 MG/DL — SIGNIFICANT CHANGE UP (ref 0.7–1.5)
EGFR: 94 ML/MIN/1.73M2 — SIGNIFICANT CHANGE UP
EOSINOPHIL # BLD AUTO: 0.1 K/UL — SIGNIFICANT CHANGE UP (ref 0–0.7)
EOSINOPHIL NFR BLD AUTO: 2.1 % — SIGNIFICANT CHANGE UP (ref 0–8)
GLUCOSE SERPL-MCNC: 143 MG/DL — HIGH (ref 70–99)
HCT VFR BLD CALC: 41.8 % — LOW (ref 42–52)
HCT VFR BLD CALC: 42 % — SIGNIFICANT CHANGE UP (ref 42–52)
HGB BLD-MCNC: 13.9 G/DL — LOW (ref 14–18)
HGB BLD-MCNC: 14.3 G/DL — SIGNIFICANT CHANGE UP (ref 14–18)
IMM GRANULOCYTES NFR BLD AUTO: 0.4 % — HIGH (ref 0.1–0.3)
LYMPHOCYTES # BLD AUTO: 1.48 K/UL — SIGNIFICANT CHANGE UP (ref 1.2–3.4)
LYMPHOCYTES # BLD AUTO: 31.2 % — SIGNIFICANT CHANGE UP (ref 20.5–51.1)
MCHC RBC-ENTMCNC: 30.2 PG — SIGNIFICANT CHANGE UP (ref 27–31)
MCHC RBC-ENTMCNC: 30.7 PG — SIGNIFICANT CHANGE UP (ref 27–31)
MCHC RBC-ENTMCNC: 33.3 G/DL — SIGNIFICANT CHANGE UP (ref 32–37)
MCHC RBC-ENTMCNC: 34 G/DL — SIGNIFICANT CHANGE UP (ref 32–37)
MCV RBC AUTO: 90.1 FL — SIGNIFICANT CHANGE UP (ref 80–94)
MCV RBC AUTO: 90.7 FL — SIGNIFICANT CHANGE UP (ref 80–94)
MONOCYTES # BLD AUTO: 0.4 K/UL — SIGNIFICANT CHANGE UP (ref 0.1–0.6)
MONOCYTES NFR BLD AUTO: 8.4 % — SIGNIFICANT CHANGE UP (ref 1.7–9.3)
NEUTROPHILS # BLD AUTO: 2.73 K/UL — SIGNIFICANT CHANGE UP (ref 1.4–6.5)
NEUTROPHILS NFR BLD AUTO: 57.5 % — SIGNIFICANT CHANGE UP (ref 42.2–75.2)
NRBC # BLD: 0 /100 WBCS — SIGNIFICANT CHANGE UP (ref 0–0)
NRBC # BLD: 0 /100 WBCS — SIGNIFICANT CHANGE UP (ref 0–0)
NRBC BLD-RTO: 0 /100 WBCS — SIGNIFICANT CHANGE UP (ref 0–0)
NRBC BLD-RTO: 0 /100 WBCS — SIGNIFICANT CHANGE UP (ref 0–0)
PLATELET # BLD AUTO: 156 K/UL — SIGNIFICANT CHANGE UP (ref 130–400)
PLATELET # BLD AUTO: 159 K/UL — SIGNIFICANT CHANGE UP (ref 130–400)
PMV BLD: 11.6 FL — HIGH (ref 7.4–10.4)
PMV BLD: 11.8 FL — HIGH (ref 7.4–10.4)
POTASSIUM SERPL-MCNC: 4.5 MMOL/L — SIGNIFICANT CHANGE UP (ref 3.5–5)
POTASSIUM SERPL-SCNC: 4.5 MMOL/L — SIGNIFICANT CHANGE UP (ref 3.5–5)
PROT SERPL-MCNC: 6.3 G/DL — SIGNIFICANT CHANGE UP (ref 6–8)
RBC # BLD: 4.61 M/UL — LOW (ref 4.7–6.1)
RBC # BLD: 4.66 M/UL — LOW (ref 4.7–6.1)
RBC # FLD: 13.2 % — SIGNIFICANT CHANGE UP (ref 11.5–14.5)
RBC # FLD: 13.2 % — SIGNIFICANT CHANGE UP (ref 11.5–14.5)
SODIUM SERPL-SCNC: 140 MMOL/L — SIGNIFICANT CHANGE UP (ref 135–146)
TROPONIN T, HIGH SENSITIVITY RESULT: 14 NG/L — SIGNIFICANT CHANGE UP (ref 6–21)
WBC # BLD: 4.75 K/UL — LOW (ref 4.8–10.8)
WBC # BLD: 5.37 K/UL — SIGNIFICANT CHANGE UP (ref 4.8–10.8)
WBC # FLD AUTO: 4.75 K/UL — LOW (ref 4.8–10.8)
WBC # FLD AUTO: 5.37 K/UL — SIGNIFICANT CHANGE UP (ref 4.8–10.8)

## 2025-01-31 PROCEDURE — C1769: CPT

## 2025-01-31 PROCEDURE — 36415 COLL VENOUS BLD VENIPUNCTURE: CPT

## 2025-01-31 PROCEDURE — 93458 L HRT ARTERY/VENTRICLE ANGIO: CPT | Mod: 59

## 2025-01-31 PROCEDURE — 99285 EMERGENCY DEPT VISIT HI MDM: CPT

## 2025-01-31 PROCEDURE — 92978 ENDOLUMINL IVUS OCT C 1ST: CPT | Mod: LC

## 2025-01-31 PROCEDURE — 71045 X-RAY EXAM CHEST 1 VIEW: CPT | Mod: 26

## 2025-01-31 PROCEDURE — 93005 ELECTROCARDIOGRAM TRACING: CPT

## 2025-01-31 PROCEDURE — C1725: CPT

## 2025-01-31 PROCEDURE — 93010 ELECTROCARDIOGRAM REPORT: CPT | Mod: 77

## 2025-01-31 PROCEDURE — C1894: CPT

## 2025-01-31 PROCEDURE — C1874: CPT

## 2025-01-31 PROCEDURE — C1753: CPT

## 2025-01-31 PROCEDURE — C1887: CPT

## 2025-01-31 PROCEDURE — C9600: CPT | Mod: LC

## 2025-01-31 PROCEDURE — 85027 COMPLETE CBC AUTOMATED: CPT

## 2025-01-31 RX ORDER — AMLODIPINE BESYLATE 5 MG
2.5 TABLET ORAL ONCE
Refills: 0 | Status: COMPLETED | OUTPATIENT
Start: 2025-01-31 | End: 2025-01-31

## 2025-01-31 RX ORDER — GLUCAGON 3 MG/1
1 POWDER NASAL ONCE
Refills: 0 | Status: DISCONTINUED | OUTPATIENT
Start: 2025-01-31 | End: 2025-01-31

## 2025-01-31 RX ORDER — DM/PSEUDOEPHED/ACETAMINOPHEN 10-30-250
25 CAPSULE ORAL ONCE
Refills: 0 | Status: DISCONTINUED | OUTPATIENT
Start: 2025-01-31 | End: 2025-01-31

## 2025-01-31 RX ORDER — AMIODARONE HYDROCHLORIDE 50 MG/ML
200 INJECTION, SOLUTION INTRAVENOUS DAILY
Refills: 0 | Status: DISCONTINUED | OUTPATIENT
Start: 2025-01-31 | End: 2025-01-31

## 2025-01-31 RX ORDER — BACTERIOSTATIC SODIUM CHLORIDE 0.9 %
1000 VIAL (ML) INJECTION
Refills: 0 | Status: DISCONTINUED | OUTPATIENT
Start: 2025-01-31 | End: 2025-01-31

## 2025-01-31 RX ORDER — ASPIRIN 81 MG/1
81 TABLET, COATED ORAL DAILY
Refills: 0 | Status: DISCONTINUED | OUTPATIENT
Start: 2025-01-31 | End: 2025-01-31

## 2025-01-31 RX ORDER — ROSUVASTATIN CALCIUM 10 MG/1
20 TABLET, FILM COATED ORAL AT BEDTIME
Refills: 0 | Status: DISCONTINUED | OUTPATIENT
Start: 2025-01-31 | End: 2025-01-31

## 2025-01-31 RX ORDER — DM/PSEUDOEPHED/ACETAMINOPHEN 10-30-250
12.5 CAPSULE ORAL ONCE
Refills: 0 | Status: DISCONTINUED | OUTPATIENT
Start: 2025-01-31 | End: 2025-01-31

## 2025-01-31 RX ORDER — INSULIN LISPRO 100/ML
VIAL (ML) SUBCUTANEOUS
Refills: 0 | Status: DISCONTINUED | OUTPATIENT
Start: 2025-01-31 | End: 2025-01-31

## 2025-01-31 RX ORDER — ASPIRIN 81 MG/1
81 TABLET, COATED ORAL ONCE
Refills: 0 | Status: COMPLETED | OUTPATIENT
Start: 2025-01-31 | End: 2025-01-31

## 2025-01-31 RX ORDER — DM/PSEUDOEPHED/ACETAMINOPHEN 10-30-250
15 CAPSULE ORAL ONCE
Refills: 0 | Status: DISCONTINUED | OUTPATIENT
Start: 2025-01-31 | End: 2025-01-31

## 2025-01-31 RX ORDER — SODIUM CHLORIDE 9 G/ML
1000 INJECTION, SOLUTION INTRAVENOUS
Refills: 0 | Status: DISCONTINUED | OUTPATIENT
Start: 2025-01-31 | End: 2025-01-31

## 2025-01-31 RX ORDER — RANOLAZINE 500 MG/1
500 TABLET, FILM COATED, EXTENDED RELEASE ORAL ONCE
Refills: 0 | Status: COMPLETED | OUTPATIENT
Start: 2025-01-31 | End: 2025-01-31

## 2025-01-31 RX ORDER — ALFUZOSIN HYDROCHLORIDE 10 MG/1
1 TABLET, EXTENDED RELEASE ORAL
Refills: 0 | DISCHARGE

## 2025-01-31 RX ORDER — BACTERIOSTATIC SODIUM CHLORIDE 0.9 %
250 VIAL (ML) INJECTION ONCE
Refills: 0 | Status: COMPLETED | OUTPATIENT
Start: 2025-01-31 | End: 2025-01-31

## 2025-01-31 RX ADMIN — Medication 500 MILLILITER(S): at 08:35

## 2025-01-31 RX ADMIN — Medication 2.5 MILLIGRAM(S): at 08:35

## 2025-01-31 RX ADMIN — ASPIRIN 81 MILLIGRAM(S): 81 TABLET, COATED ORAL at 08:28

## 2025-01-31 RX ADMIN — RANOLAZINE 500 MILLIGRAM(S): 500 TABLET, FILM COATED, EXTENDED RELEASE ORAL at 08:35

## 2025-01-31 NOTE — ED PROVIDER NOTE - CLINICAL SUMMARY MEDICAL DECISION MAKING FREE TEXT BOX
71 y/o M with PMHx HLD, HTN, paroxysmal Afib (on Xarelto - last dose on 1/28/25, DM-II, CAD with prior miltiple PCIs, with most recent ones PCI/MARTHA ostial RPDA on 1/6/23 and PCI/MARTHA oCx 4/28/23, who initially presented to his cardiologist with c/o palpitations and exertional chest discomfort, and today had to come to the ER due to left sided chest pain awakening him from sleep.  Pt was started on Amiodarone in 11/2024. He underwent a stress echo 2 weeks ago which was positive for ischemia. Pt is now referred to ED for admission  for LHC with possible intervention if clinically indicated.  [facility].  ADMIT.

## 2025-01-31 NOTE — ASU DISCHARGE PLAN (ADULT/PEDIATRIC) - ASU DC SPECIAL INSTRUCTIONSFT
Discharge Instructions as follows:  - Continue medical regimen as prescribed to prevent chest pain.  - If you are diabetic and taking medication containing Metformin, do not take them for 48 hours after the procedure  - Continue dual anti-platelet therapy, beta blocker, Statin   - Instructed to call 911 if chest pain, shortness of breath or bleeding from access site.  - No heavy lifting > 10lbs x 1 week.  - No driving x 24 hours.  - No baths, swimming pools x 1 week, may shower  - Low sodium low fat low cholesterol diet  - Follow-up with Cardiologist in 1-2 weeks after discharge  - Soreness or tenderness at the site is possible, it will diminish over time. You may take Tylenol every 4-6 hours as needed. Nothing stronger is needed. NO Motrin/Ibuprofen  - Any questions call cardiac cath lab [phone] Discharge Instructions as follows:  - Continue medical regimen as prescribed to prevent chest pain.  - If you are diabetic and taking medication containing Metformin, do not take them for 48 hours after the procedure  - Continue dual anti-platelet therapy, beta blocker, Statin and Xarelto  - resume Xarelto tomorrow 2/1 in PM  - Stop aspirin after 1 month (3/1) and continue Xarelto/Plavix  - Instructed to call 911 if chest pain, shortness of breath or bleeding from access site.  - No heavy lifting > 10lbs x 1 week.  - No driving x 24 hours.  - No baths, swimming pools x 1 week, may shower  - Low sodium low fat low cholesterol diet  - Follow-up with Cardiologist in 1-2 weeks after discharge  - Soreness or tenderness at the site is possible, it will diminish over time. You may take Tylenol every 4-6 hours as needed. Nothing stronger is needed. NO Motrin/Ibuprofen  - Any questions call cardiac cath lab [phone]

## 2025-01-31 NOTE — CHART NOTE - NSCHARTNOTEFT_GEN_A_CORE
PRE-OP DIAGNOSIS: Unstable angina     PROCEDURE:     [x] Coronary Angiogram   [x] LHC   [] LVG   [] RHC     PRIMARY PHYSICIAN:  Dr. Khan  FELLOW: Dr. Varner      PROCEDURE DESCRIPTION:   Anesthesia: Local + Sedation     Access & Closure:   6-Fr Radial Artery => TR band     IV Contrast:  100mL      ESTIMATED BLOOD LOSS: <10cc  SPECIMENS REMOVED: None    Intervention: IVUS-guided PCI of distal LCX at the distal edge of patent prior stent  Implants:  PATO FRONTIER RX [id number]        FINDINGS:   DOMINANCE: Right     LM: Minor luminal irregularities     LAD: Mild disease  D1: Mild disease     CX: Patent prior proximal stent with 90% disease at the distal edge s/p PCI.  OM1: Mild disease    RCA: Mild disease  RPDA: Patent prior stent  RPL: Patent prior stent         LVEDP: 22 mmHg   EF:  50%     POST-OP DIAGNOSIS:  1- Vessel Coronary Artery Disease (LCX)  90% disease at the distal edge of patent prior LCX stent s/p IVUS-guided PCI with PATO FRONTIER RX 3X15MM.  Patent prior RPDA and RPL stents.      PLAN OF CARE:   [x] Post-procedure LVEDP-guided IV fluids: NS 75/hr x 6hrs  [x] Medications: ASA/Plavix. High-intensity statin.    DISPOSITION:    [x] Return to In-patient bed

## 2025-01-31 NOTE — ED ADULT NURSE REASSESSMENT NOTE - NS ED NURSE REASSESS COMMENT FT1
pt received from previous RN A/o times 4 asymptomatic MD at bed site [employer] attending at bed site VS stable ready to be transport with  transporter and MD .

## 2025-01-31 NOTE — ED PROVIDER NOTE - PHYSICAL EXAMINATION
CONSTITUTIONAL: Well-developed; well-nourished; in no acute distress.   SKIN: warm, dry  HEAD: Normocephalic; atraumatic.  EYES: PERRL, EOMI, no conjunctival erythema  ENT: No nasal discharge; airway clear.  NECK: Supple; non tender.  CARD:  Regular rate and rhythm.   RESP: No wheezes, rales or rhonchi.  ABD: soft ntnd  EXT: Normal ROM.  No clubbing, cyanosis or edema.   LYMPH: No acute cervical adenopathy.  NEURO: Alert, oriented, grossly unremarkable  PSYCH: Cooperative, appropriate.

## 2025-01-31 NOTE — PROGRESS NOTE ADULT - SUBJECTIVE AND OBJECTIVE BOX
Cardiology Follow up    RAHUL VICTORIA   74y Male  PAST MEDICAL & SURGICAL HISTORY:  HLD (hyperlipidemia)      HTN (hypertension)      Paroxysmal A-fib      CAD (coronary artery disease)      DM (diabetes mellitus)      2019 novel coronavirus disease (COVID-19)      History of percutaneous angioplasty      S/P hemorrhoidectomy           HPI:    71 y/o M with PMHx HLD, HTN, paroxysmal Afib (on Xarelto - last dose on 1/28/25, DM-II, CAD with prior miltiple PCIs, with most recent ones PCI/MARTHA ostial RPDA on 1/6/23 and PCI/MARTHA oCx 4/28/23, who initially presented to his cardiologist with c/o palpitations and exertional chest discomfort, and today had to come to the ER due to left sided chest pain awakening him from sleep.  Pt was started on Amiodarone in 11/2024.  Denies any SOB, dizziness, n/v, diaphoresis, orthopnea, PND, LE edema, syncope.  He underwent a stress echo 2 weeks ago which was positive for ischemia.   Pt is now referred for C with possible intervention if clinically indicated.     Pre cath note:  indication:  [ ] STEMI                [ ] NSTEMI                 [ ] Acute coronary syndrome                   [x ]Unstable Angina   [ ] high risk  [x ] intermediate risk  [ ] low risk                   [ ] Stable Angina     non-invasive testing:        stress echo           Date:                     result: [ ] high risk  [ x] intermediate risk  [ ] low risk    Anti- Anginal medications:                    [ x] not used                  [ ] used   ( ) BB     ( ) CCB      ( ) Nitrate   (  ) Ranexa          [ ] not used but strong indication not to use  -Amlodipine 2.5mg po x 1  -Ranexa 500mg po x 1    Ejection Fraction                   [ ] <29            [ ] 30-39%   [ ] 40-49%     [ ]>50%    CHF          [ ] active (within last 14 days on meds   [ ] Chronic (on meds but no exacerbation)  NYHA Functional Class:  (  ) Class I (no limitations)  (  ) Class II (slight limitation)  (  ) Class III (marked limitation)  (  ) Class IV (symptoms at rest)    COPD                   [ ] mild (on chronic bronchodilators)  [ ] moderate (on chronic steroid therapy)      [ ] severe (indication for home O2 or PACO2 >50)    Other risk factors:                     [ ] Previous MI                     [ ] CVA/ stroke                    [ ] carotid stent/ CEA                    [ ] PVD/PAD- (arterial aneurysm, non-palpable pulses, tortuous vessel with inability to insert catheter, infra-renal dissection, renal or subclavian artery stenosis)                    [ ] previous CABG                    [ ] Renal Failure:  on HD  (  ) yes  (  ) no                    [x ] Diabetic  (  ) Type 1  (  x) Type 2                                         (  ) Insulin dependent  (  ) non-insulin dependent                                         (  x) Metformin  (  ) Januvia  (  ) Glimepiride  (  ) Glipizide  (  ) Glyburide  (  ) Actos                                         (  ) GLP-1 receptor agonists (Ozempic, Mounjaro, Wegovy, Zepbound, Trulicity, Byetta, Victoza)                                         (x  ) SGLT2 Inhibitors (Farxiga, Jardiance, Invokana)                                         (  ) Other    Bleeding Risk: 0.7%    Pre-cath Hydration: ( x )  cc IV bolus x 1 over 1 hr followed by:    ( x ) NS @ 75cc/hr until procedure (up to 2 hrs) if EF> 50%                                                                                                                               RIGHT RADIAL ARTERY EVALUATION:  JESUS ALBERTO TEST: [] Negative          [x] Positive          (31 Jan 2025 08:18)    Allergies    No Known Allergies    Intolerances      Patient seen and examined at bedside.   Patient without complaints.   Denies CP, SOB, palpitations, or dizziness    Vital Signs  HR: 58  BP: 120/58  RR: 18   SpO2: 98% on RA    Parameters below as of 31 Jan 2025 10:30  Patient On (Oxygen Delivery Method): room air        MEDICATIONS  (STANDING):  aMIOdarone    Tablet 200 milliGRAM(s) Oral daily  aspirin enteric coated 81 milliGRAM(s) Oral daily  dextrose 5%. 1000 milliLiter(s) (100 mL/Hr) IV Continuous <Continuous>  dextrose 5%. 1000 milliLiter(s) (50 mL/Hr) IV Continuous <Continuous>  dextrose 50% Injectable 25 Gram(s) IV Push once  dextrose 50% Injectable 12.5 Gram(s) IV Push once  dextrose 50% Injectable 25 Gram(s) IV Push once  glucagon  Injectable 1 milliGRAM(s) IntraMuscular once  insulin lispro (ADMELOG) corrective regimen sliding scale   SubCutaneous Before meals and at bedtime  lisinopril 10 milliGRAM(s) Oral daily  rosuvastatin 20 milliGRAM(s) Oral at bedtime  sodium chloride 0.9%. 1000 milliLiter(s) (75 mL/Hr) IV Continuous <Continuous>    MEDICATIONS  (PRN):  dextrose Oral Gel 15 Gram(s) Oral once PRN Blood Glucose LESS THAN 70 milliGRAM(s)/deciliter      REVIEW OF SYSTEMS:          All negative except as mentioned in HPI    PHYSICAL EXAM:           CONSTITUTIONAL: Well-developed; well-nourished; in no acute distress  	SKIN: warm, dry  	HEAD: Normocephalic; atraumatic  	EYES: PERRL.  	ENT: No nasal discharge, airway clear, mucous membranes moist  	NECK: Supple; non tender.  	CARD: +S1, +S2, no murmurs, gallops, or rubs. Regular rate and rhythm    	RESP: No wheezes, rales or rhonchi. CTA B/L  	ABD: soft ntnd, + BS x 4 quadrants  	EXT: moves all extremities,  no clubbing, cyanosis or edema  	NEURO: Alert and oriented x3, no focal deficits          PSYCH: Cooperative, appropriate          VASCULAR:  + Rad / + PTs / + DPs          EXTREMITY:             	   Right Radial: Dressing D/C/I, DSTAT, access site soft, no hematoma, no pain, + pulses, no sign of infection, no numbness            ECG: pending    LABS:                        14.3   4.75  )-----------( 159      ( 31 Jan 2025 07:27 )             42.0     01-31    140  |  106  |  16  ----------------------------<  143[H]  4.5   |  24  |  0.8    Ca    9.0      31 Jan 2025 07:27    TPro  6.3  /  Alb  4.4  /  TBili  0.4  /  DBili  x   /  AST  13  /  ALT  14  /  AlkPhos  51  01-31        LIVER FUNCTIONS - ( 31 Jan 2025 07:27 )  Alb: 4.4 g/dL / Pro: 6.3 g/dL / ALK PHOS: 51 U/L / ALT: 14 U/L / AST: 13 U/L / GGT: x                 A/P:  I discussed the case with Cardiologist Dr. Khan  and recommend the following:    S/P PCI:        Intervention: IVUS-guided PCI of distal LCX at the distal edge of patent prior stent  Implants:  PATO FRONTIER RX [id number]    FINDINGS:   DOMINANCE: Right     LM: Minor luminal irregularities     LAD: Mild disease  D1: Mild disease     CX: Patent prior proximal stent with 90% disease at the distal edge s/p PCI.  OM1: Mild disease    RCA: Mild disease  RPDA: Patent prior stent  RPL: Patent prior stent     LVEDP: 22 mmHg   EF:  50%     POST-OP DIAGNOSIS:  1- Vessel Coronary Artery Disease (LCX)  90% disease at the distal edge of patent prior LCX stent s/p IVUS-guided PCI with PATO FRONTIER RX [id number].  Patent prior RPDA and RPL stents.          	     Continue DAPT ( Aspirin 81 mg PO Daily and Plavix 75 mg daily ), Statin Therapy, Lisinopril, Amio                   Patient pharmacy called in for Brilinta  prescription and it is not covered by insurance                   Patient given 30 day supply of ( Aspirin 81 mg daily and Plavix 75 mg daily ) to take at home                   NO BB, d/t bradycardia                   CBC at 1330                    EKG prior to d/c at 1330                   NS 75 ml/hr x 6hrs                   OOB Ambulate                    Monitor access site/ distal pulses                   Patient agreeing to take DAPT for at least one year or as directed by cardiologist                    Pt given instructions on importance of taking antiplatelet medication or risk acute stent thrombosis/death                   Post cath instructions, access site care and activity restrictions reviewed with patient                     Discussed with patient to return to hospital if experience chest pain, shortness breath, dizziness and site bleeding                   Aggressive risk factor modification, diet counseling, smoking cessation discussed with patient                    Benefits of cardiac rehab discussed with patient                    Cardiac rehab info provided/referral and communication to cardiac rehab completed                   Patient instructed to call cardiac rehab and make initial appointment after first follow-up visit with Cardiologist                    Can discharge patient 1540 from cardiac standpoint after ambulating without symptoms, access site wnl, labs and ECG reviewed                    Follow up with Cardiology Dr. Khan in two weeks.  Instructed to call and make an appointment                                       Cardiology Follow up    RAHUL VICTORIA   74y Male  PAST MEDICAL & SURGICAL HISTORY:  HLD (hyperlipidemia)      HTN (hypertension)      Paroxysmal A-fib      CAD (coronary artery disease)      DM (diabetes mellitus)      2019 novel coronavirus disease (COVID-19)      History of percutaneous angioplasty      S/P hemorrhoidectomy           HPI:    73 y/o M with PMHx HLD, HTN, paroxysmal Afib (on Xarelto - last dose on 1/28/25, DM-II, CAD with prior miltiple PCIs, with most recent ones PCI/MARTHA ostial RPDA on 1/6/23 and PCI/MARTHA oCx 4/28/23, who initially presented to his cardiologist with c/o palpitations and exertional chest discomfort, and today had to come to the ER due to left sided chest pain awakening him from sleep.  Pt was started on Amiodarone in 11/2024.  Denies any SOB, dizziness, n/v, diaphoresis, orthopnea, PND, LE edema, syncope.  He underwent a stress echo 2 weeks ago which was positive for ischemia.   Pt is now referred for C with possible intervention if clinically indicated.     Pre cath note:  indication:  [ ] STEMI                [ ] NSTEMI                 [ ] Acute coronary syndrome                   [x ]Unstable Angina   [ ] high risk  [x ] intermediate risk  [ ] low risk                   [ ] Stable Angina     non-invasive testing:        stress echo           Date:                     result: [ ] high risk  [ x] intermediate risk  [ ] low risk    Anti- Anginal medications:                    [ x] not used                  [ ] used   ( ) BB     ( ) CCB      ( ) Nitrate   (  ) Ranexa          [ ] not used but strong indication not to use  -Amlodipine 2.5mg po x 1  -Ranexa 500mg po x 1    Ejection Fraction                   [ ] <29            [ ] 30-39%   [ ] 40-49%     [ ]>50%    CHF          [ ] active (within last 14 days on meds   [ ] Chronic (on meds but no exacerbation)  NYHA Functional Class:  (  ) Class I (no limitations)  (  ) Class II (slight limitation)  (  ) Class III (marked limitation)  (  ) Class IV (symptoms at rest)    COPD                   [ ] mild (on chronic bronchodilators)  [ ] moderate (on chronic steroid therapy)      [ ] severe (indication for home O2 or PACO2 >50)    Other risk factors:                     [ ] Previous MI                     [ ] CVA/ stroke                    [ ] carotid stent/ CEA                    [ ] PVD/PAD- (arterial aneurysm, non-palpable pulses, tortuous vessel with inability to insert catheter, infra-renal dissection, renal or subclavian artery stenosis)                    [ ] previous CABG                    [ ] Renal Failure:  on HD  (  ) yes  (  ) no                    [x ] Diabetic  (  ) Type 1  (  x) Type 2                                         (  ) Insulin dependent  (  ) non-insulin dependent                                         (  x) Metformin  (  ) Januvia  (  ) Glimepiride  (  ) Glipizide  (  ) Glyburide  (  ) Actos                                         (  ) GLP-1 receptor agonists (Ozempic, Mounjaro, Wegovy, Zepbound, Trulicity, Byetta, Victoza)                                         (x  ) SGLT2 Inhibitors (Farxiga, Jardiance, Invokana)                                         (  ) Other    Bleeding Risk: 0.7%    Pre-cath Hydration: ( x )  cc IV bolus x 1 over 1 hr followed by:    ( x ) NS @ 75cc/hr until procedure (up to 2 hrs) if EF> 50%                                                                                                                               RIGHT RADIAL ARTERY EVALUATION:  JESUS ALBERTO TEST: [] Negative          [x] Positive          (31 Jan 2025 08:18)    Allergies    No Known Allergies    Intolerances      Patient seen and examined at bedside.   Patient without complaints.   Denies CP, SOB, palpitations, or dizziness    Vital Signs  HR: 58  BP: 120/58  RR: 18   SpO2: 98% on RA    Parameters below as of 31 Jan 2025 10:30  Patient On (Oxygen Delivery Method): room air        MEDICATIONS  (STANDING):  aMIOdarone    Tablet 200 milliGRAM(s) Oral daily  aspirin enteric coated 81 milliGRAM(s) Oral daily  dextrose 5%. 1000 milliLiter(s) (100 mL/Hr) IV Continuous <Continuous>  dextrose 5%. 1000 milliLiter(s) (50 mL/Hr) IV Continuous <Continuous>  dextrose 50% Injectable 25 Gram(s) IV Push once  dextrose 50% Injectable 12.5 Gram(s) IV Push once  dextrose 50% Injectable 25 Gram(s) IV Push once  glucagon  Injectable 1 milliGRAM(s) IntraMuscular once  insulin lispro (ADMELOG) corrective regimen sliding scale   SubCutaneous Before meals and at bedtime  lisinopril 10 milliGRAM(s) Oral daily  rosuvastatin 20 milliGRAM(s) Oral at bedtime  sodium chloride 0.9%. 1000 milliLiter(s) (75 mL/Hr) IV Continuous <Continuous>    MEDICATIONS  (PRN):  dextrose Oral Gel 15 Gram(s) Oral once PRN Blood Glucose LESS THAN 70 milliGRAM(s)/deciliter      REVIEW OF SYSTEMS:          All negative except as mentioned in HPI    PHYSICAL EXAM:           CONSTITUTIONAL: Well-developed; well-nourished; in no acute distress  	SKIN: warm, dry  	HEAD: Normocephalic; atraumatic  	EYES: PERRL.  	ENT: No nasal discharge, airway clear, mucous membranes moist  	NECK: Supple; non tender.  	CARD: +S1, +S2, no murmurs, gallops, or rubs. Regular rate and rhythm    	RESP: No wheezes, rales or rhonchi. CTA B/L  	ABD: soft ntnd, + BS x 4 quadrants  	EXT: moves all extremities,  no clubbing, cyanosis or edema  	NEURO: Alert and oriented x3, no focal deficits          PSYCH: Cooperative, appropriate          VASCULAR:  + Rad / + PTs / + DPs          EXTREMITY:             	   Right Radial: Dressing D/C/I, DSTAT, access site soft, no hematoma, no pain, + pulses, no sign of infection, no numbness            ECG: pending    LABS:                        14.3   4.75  )-----------( 159      ( 31 Jan 2025 07:27 )             42.0     01-31    140  |  106  |  16  ----------------------------<  143[H]  4.5   |  24  |  0.8    Ca    9.0      31 Jan 2025 07:27    TPro  6.3  /  Alb  4.4  /  TBili  0.4  /  DBili  x   /  AST  13  /  ALT  14  /  AlkPhos  51  01-31        LIVER FUNCTIONS - ( 31 Jan 2025 07:27 )  Alb: 4.4 g/dL / Pro: 6.3 g/dL / ALK PHOS: 51 U/L / ALT: 14 U/L / AST: 13 U/L / GGT: x                 A/P:  I discussed the case with Cardiologist Dr. Khan  and recommend the following:    S/P PCI:        Intervention: IVUS-guided PCI of distal LCX at the distal edge of patent prior stent  Implants:  PATO FRONTIER RX [id number]    FINDINGS:   DOMINANCE: Right     LM: Minor luminal irregularities     LAD: Mild disease  D1: Mild disease     CX: Patent prior proximal stent with 90% disease at the distal edge s/p PCI.  OM1: Mild disease    RCA: Mild disease  RPDA: Patent prior stent  RPL: Patent prior stent     LVEDP: 22 mmHg   EF:  50%     POST-OP DIAGNOSIS:  1- Vessel Coronary Artery Disease (LCX)  90% disease at the distal edge of patent prior LCX stent s/p IVUS-guided PCI with PATO FRONTIER RX [id number].  Patent prior RPDA and RPL stents.          	     Continue DAPT ( Aspirin 81 mg PO Daily and Plavix 75 mg daily ), Statin Therapy, Lisinopril, Amio                   Patient given 30 day supply of ( Aspirin 81 mg daily and Plavix 75 mg daily ) to take at home                   NO BB, d/t bradycardia                   Resume Xarelto tomorrow 2/1 in PM                   Triple therapy ASA/Plavix/ Xarelto x 1 month, then stop asa c/w Plavix/Xarelto                   HOLD Metformin 48 hrs post cath, resume Sunday 2/2 in AM                   CBC at 1330                    EKG prior to d/c at 1330                   NS 75 ml/hr x 6hrs                   OOB Ambulate                    Monitor access site/ distal pulses                   Patient agreeing to take DAPT for at least one year or as directed by cardiologist                    Pt given instructions on importance of taking antiplatelet medication or risk acute stent thrombosis/death                   Post cath instructions, access site care and activity restrictions reviewed with patient                     Discussed with patient to return to hospital if experience chest pain, shortness breath, dizziness and site bleeding                   Aggressive risk factor modification, diet counseling, smoking cessation discussed with patient                    Benefits of cardiac rehab discussed with patient                    Cardiac rehab info provided/referral and communication to cardiac rehab completed                   Patient instructed to call cardiac rehab and make initial appointment after first follow-up visit with Cardiologist                    Can discharge patient 1540 from cardiac standpoint after ambulating without symptoms, access site wnl, labs and ECG reviewed                    Follow up with Cardiology Dr. Khan in two weeks.  Instructed to call and make an appointment                                       Cardiology Follow up    RAHUL VICTORIA   74y Male  PAST MEDICAL & SURGICAL HISTORY:  HLD (hyperlipidemia)      HTN (hypertension)      Paroxysmal A-fib      CAD (coronary artery disease)      DM (diabetes mellitus)      2019 novel coronavirus disease (COVID-19)      History of percutaneous angioplasty      S/P hemorrhoidectomy           HPI:    73 y/o M with PMHx HLD, HTN, paroxysmal Afib (on Xarelto - last dose on 1/28/25, DM-II, CAD with prior miltiple PCIs, with most recent ones PCI/MARTHA ostial RPDA on 1/6/23 and PCI/MARTHA oCx 4/28/23, who initially presented to his cardiologist with c/o palpitations and exertional chest discomfort, and today had to come to the ER due to left sided chest pain awakening him from sleep.  Pt was started on Amiodarone in 11/2024.  Denies any SOB, dizziness, n/v, diaphoresis, orthopnea, PND, LE edema, syncope.  He underwent a stress echo 2 weeks ago which was positive for ischemia.   Pt is now referred for C with possible intervention if clinically indicated.     Pre cath note:  indication:  [ ] STEMI                [ ] NSTEMI                 [ ] Acute coronary syndrome                   [x ]Unstable Angina   [ ] high risk  [x ] intermediate risk  [ ] low risk                   [ ] Stable Angina     non-invasive testing:        stress echo           Date:                     result: [ ] high risk  [ x] intermediate risk  [ ] low risk    Anti- Anginal medications:                    [ x] not used                  [ ] used   ( ) BB     ( ) CCB      ( ) Nitrate   (  ) Ranexa          [ ] not used but strong indication not to use  -Amlodipine 2.5mg po x 1  -Ranexa 500mg po x 1    Ejection Fraction                   [ ] <29            [ ] 30-39%   [ ] 40-49%     [ ]>50%    CHF          [ ] active (within last 14 days on meds   [ ] Chronic (on meds but no exacerbation)  NYHA Functional Class:  (  ) Class I (no limitations)  (  ) Class II (slight limitation)  (  ) Class III (marked limitation)  (  ) Class IV (symptoms at rest)    COPD                   [ ] mild (on chronic bronchodilators)  [ ] moderate (on chronic steroid therapy)      [ ] severe (indication for home O2 or PACO2 >50)    Other risk factors:                     [ ] Previous MI                     [ ] CVA/ stroke                    [ ] carotid stent/ CEA                    [ ] PVD/PAD- (arterial aneurysm, non-palpable pulses, tortuous vessel with inability to insert catheter, infra-renal dissection, renal or subclavian artery stenosis)                    [ ] previous CABG                    [ ] Renal Failure:  on HD  (  ) yes  (  ) no                    [x ] Diabetic  (  ) Type 1  (  x) Type 2                                         (  ) Insulin dependent  (  ) non-insulin dependent                                         (  x) Metformin  (  ) Januvia  (  ) Glimepiride  (  ) Glipizide  (  ) Glyburide  (  ) Actos                                         (  ) GLP-1 receptor agonists (Ozempic, Mounjaro, Wegovy, Zepbound, Trulicity, Byetta, Victoza)                                         (x  ) SGLT2 Inhibitors (Farxiga, Jardiance, Invokana)                                         (  ) Other    Bleeding Risk: 0.7%    Pre-cath Hydration: ( x )  cc IV bolus x 1 over 1 hr followed by:    ( x ) NS @ 75cc/hr until procedure (up to 2 hrs) if EF> 50%                                                                                                                               RIGHT RADIAL ARTERY EVALUATION:  JESUS ALBERTO TEST: [] Negative          [x] Positive          (31 Jan 2025 08:18)    Allergies    No Known Allergies    Intolerances      Patient seen and examined at bedside.   Patient without complaints.   Denies CP, SOB, palpitations, or dizziness    Vital Signs  HR: 58  BP: 120/58  RR: 18   SpO2: 98% on RA    Parameters below as of 31 Jan 2025 10:30  Patient On (Oxygen Delivery Method): room air        MEDICATIONS  (STANDING):  aMIOdarone    Tablet 200 milliGRAM(s) Oral daily  aspirin enteric coated 81 milliGRAM(s) Oral daily  dextrose 5%. 1000 milliLiter(s) (100 mL/Hr) IV Continuous <Continuous>  dextrose 5%. 1000 milliLiter(s) (50 mL/Hr) IV Continuous <Continuous>  dextrose 50% Injectable 25 Gram(s) IV Push once  dextrose 50% Injectable 12.5 Gram(s) IV Push once  dextrose 50% Injectable 25 Gram(s) IV Push once  glucagon  Injectable 1 milliGRAM(s) IntraMuscular once  insulin lispro (ADMELOG) corrective regimen sliding scale   SubCutaneous Before meals and at bedtime  lisinopril 10 milliGRAM(s) Oral daily  rosuvastatin 20 milliGRAM(s) Oral at bedtime  sodium chloride 0.9%. 1000 milliLiter(s) (75 mL/Hr) IV Continuous <Continuous>    MEDICATIONS  (PRN):  dextrose Oral Gel 15 Gram(s) Oral once PRN Blood Glucose LESS THAN 70 milliGRAM(s)/deciliter      REVIEW OF SYSTEMS:          All negative except as mentioned in HPI    PHYSICAL EXAM:           CONSTITUTIONAL: Well-developed; well-nourished; in no acute distress  	SKIN: warm, dry  	HEAD: Normocephalic; atraumatic  	EYES: PERRL.  	ENT: No nasal discharge, airway clear, mucous membranes moist  	NECK: Supple; non tender.  	CARD: +S1, +S2, no murmurs, gallops, or rubs. Regular rate and rhythm    	RESP: No wheezes, rales or rhonchi. CTA B/L  	ABD: soft ntnd, + BS x 4 quadrants  	EXT: moves all extremities,  no clubbing, cyanosis or edema  	NEURO: Alert and oriented x3, no focal deficits          PSYCH: Cooperative, appropriate          VASCULAR:  + Rad / + PTs / + DPs          EXTREMITY:             	   Right Radial: Dressing D/C/I, DSTAT, access site soft, no hematoma, no pain, + pulses, no sign of infection, no numbness            ECG: pending    LABS:                        14.3   4.75  )-----------( 159      ( 31 Jan 2025 07:27 )             42.0     01-31    140  |  106  |  16  ----------------------------<  143[H]  4.5   |  24  |  0.8    Ca    9.0      31 Jan 2025 07:27    TPro  6.3  /  Alb  4.4  /  TBili  0.4  /  DBili  x   /  AST  13  /  ALT  14  /  AlkPhos  51  01-31        LIVER FUNCTIONS - ( 31 Jan 2025 07:27 )  Alb: 4.4 g/dL / Pro: 6.3 g/dL / ALK PHOS: 51 U/L / ALT: 14 U/L / AST: 13 U/L / GGT: x                 A/P:  I discussed the case with Cardiologist Dr. Khan  and recommend the following:    S/P PCI:        Intervention: IVUS-guided PCI of distal LCX at the distal edge of patent prior stent  Implants:  PATO FRONTIER RX [id number]    FINDINGS:   DOMINANCE: Right     LM: Minor luminal irregularities     LAD: Mild disease  D1: Mild disease     CX: Patent prior proximal stent with 90% disease at the distal edge s/p PCI.  OM1: Mild disease    RCA: Mild disease  RPDA: Patent prior stent  RPL: Patent prior stent     LVEDP: 22 mmHg   EF:  50%     POST-OP DIAGNOSIS:  1- Vessel Coronary Artery Disease (LCX)  90% disease at the distal edge of patent prior LCX stent s/p IVUS-guided PCI with PATO FRONTIER RX [id number].  Patent prior RPDA and RPL stents.          	     Continue DAPT ( Aspirin 81 mg PO Daily and Plavix 75 mg daily ), Statin Therapy, Lisinopril, Amio                   Patient given 30 day supply of ( Aspirin 81 mg daily and Plavix 75 mg daily ) to take at home                   NO BB, d/t bradycardia                   Resume Xarelto tomorrow 2/1 in PM                   Triple therapy ASA/Plavix/ Xarelto x 1 month, then stop asa c/w Plavix/Xarelto                   HOLD Metformin 48 hrs post cath, resume Sunday 2/2 in AM                   CBC at 1330                    EKG prior to d/c at 1330                   NS 75 ml/hr x 6hrs                   OOB Ambulate                    Monitor access site/ distal pulses                   Patient agreeing to take DAPT for at least one year or as directed by cardiologist                    Pt given instructions on importance of taking antiplatelet medication or risk acute stent thrombosis/death                   Post cath instructions, access site care and activity restrictions reviewed with patient                     Discussed with patient to return to hospital if experience chest pain, shortness breath, dizziness and site bleeding                   Aggressive risk factor modification, diet counseling, smoking cessation discussed with patient                    Benefits of cardiac rehab discussed with patient                    Cardiac rehab info provided/referral and communication to cardiac rehab completed                   Patient instructed to call cardiac rehab and make initial appointment after first follow-up visit with Cardiologist                    Can discharge patient 1540 from cardiac standpoint after ambulating without symptoms, access site wnl, labs and ECG reviewed                    Follow up with Cardiology Dr. Khan in two weeks.  Instructed to call and make an appointment                                      Addendum:   Post PCI EKG reviewed and post PCI CBC stable , can d/c home at 1540 if stable.

## 2025-01-31 NOTE — H&P CARDIOLOGY - NSICDXPASTMEDICALHX_GEN_ALL_CORE_FT
PAST MEDICAL HISTORY:  2019 novel coronavirus disease (COVID-19)     CAD (coronary artery disease)     DM (diabetes mellitus)     HLD (hyperlipidemia)     HTN (hypertension)     Paroxysmal A-fib

## 2025-01-31 NOTE — H&P CARDIOLOGY - HISTORY OF PRESENT ILLNESS
73 y/o M with PMHx HLD, HTN, paroxysmal Afib (on Xarelto - last dose on 1/28/25, DM-II, CAD with prior miltiple PCIs, with most recent ones PCI/MARTHA ostial RPDA on 1/6/23 and PCI/MARTHA oCx 4/28/23, who initially presented to his cardiologist with c/o palpitations and exertional chest discomfort, and today had to come to the ER due to left sided chest pain awakening him from sleep.  Pt was started on Amiodarone in 11/2024.  Denies any SOB, dizziness, n/v, diaphoresis, orthopnea, PND, LE edema, syncope.  He underwent a stress echo 2 weeks ago which was positive for ischemia.   Pt is now referred for [facility] with possible intervention if clinically indicated.     Pre cath note:  indication:  [ ] STEMI                [ ] NSTEMI                 [ ] Acute coronary syndrome                   [x ]Unstable Angina   [ ] high risk  [x ] intermediate risk  [ ] low risk                   [ ] Stable Angina     non-invasive testing:        stress echo           Date:                     result: [ ] high risk  [ x] intermediate risk  [ ] low risk    Anti- Anginal medications:                    [ x] not used                  [ ] used   ( ) BB     ( ) CCB      ( ) Nitrate   (  ) Ranexa          [ ] not used but strong indication not to use  -Amlodipine 2.5mg po x 1  -Ranexa 500mg po x 1    Ejection Fraction                   [ ] <29            [ ] 30-39%   [ ] 40-49%     [ ]>50%    CHF          [ ] active (within last 14 days on meds   [ ] Chronic (on meds but no exacerbation)  NYHA Functional Class:  (  ) Class I (no limitations)  (  ) Class II (slight limitation)  (  ) Class III (marked limitation)  (  ) Class IV (symptoms at rest)    COPD                   [ ] mild (on chronic bronchodilators)  [ ] moderate (on chronic steroid therapy)      [ ] severe (indication for home O2 or PACO2 >50)    Other risk factors:                     [ ] Previous MI                     [ ] CVA/ stroke                    [ ] carotid stent/ CEA                    [ ] PVD/PAD- (arterial aneurysm, non-palpable pulses, tortuous vessel with inability to insert catheter, infra-renal dissection, renal or subclavian artery stenosis)                    [ ] previous CABG                    [ ] Renal Failure:  on HD  (  ) yes  (  ) no                    [x ] Diabetic  (  ) Type 1  (  x) Type 2                                         (  ) Insulin dependent  (  ) non-insulin dependent                                         (  x) Metformin  (  ) Januvia  (  ) Glimepiride  (  ) Glipizide  (  ) Glyburide  (  ) Actos                                         (  ) GLP-1 receptor agonists (Ozempic, Mounjaro, Wegovy, Zepbound, Trulicity, Byetta, Victoza)                                         (x  ) SGLT2 Inhibitors (Farxiga, Jardiance, Invokana)                                         (  ) Other    Bleeding Risk: 0.7%    Pre-cath Hydration: ( x )  cc IV bolus x 1 over 1 hr followed by:    ( x ) NS @ 75cc/hr until procedure (up to 2 hrs) if EF> 50%                                                                                                                               RIGHT RADIAL ARTERY EVALUATION:  JESUS ALBERTO TEST: [] Negative          [x] Positive

## 2025-01-31 NOTE — H&P CARDIOLOGY - GASTROINTESTINAL
detailed exam
Constitutional: denies fever, chills, diaphoresis   HEENT: denies blurry vision, difficulty hearing  Respiratory: reports SOB, cough, clear sputum production, and wheezing, denies ALDANA, hemoptysis  Cardiovascular: denies CP, palpitations, edema  Gastrointestinal: denies nausea, vomiting, diarrhea, constipation, abdominal pain, melena  Genitourinary: denies dysuria, frequency, urgency  Skin/Breast: denies rash, itching  Musculoskeletal: denies myalgias, joint swelling, muscle weakness  Neurologic: denies headache, weakness, dizziness, paresthesias, numbness/tingling  Psychiatric: denies feeling anxious, depressed

## 2025-01-31 NOTE — ASU DISCHARGE PLAN (ADULT/PEDIATRIC) - FINANCIAL ASSISTANCE
[facility] provides services at a reduced cost to those who are determined to be eligible through [facility]’s financial assistance program. Information regarding [facility]’s financial assistance program can be found by going to [email or web address] or by calling [phone].

## 2025-01-31 NOTE — ED PROVIDER NOTE - OBJECTIVE STATEMENT
71 y/o M with PMHx HLD, HTN, paroxysmal Afib (on Xarelto - last dose on 1/28/25, DM-II, CAD with prior miltiple PCIs, with most recent ones PCI/MARTHA ostial RPDA on 1/6/23 and PCI/MARTHA oCx 4/28/23, who initially presented to his cardiologist with c/o palpitations and exertional chest discomfort, and today had to come to the ER due to left sided chest pain awakening him from sleep.  Pt was started on Amiodarone in 11/2024.  Denies any SOB, dizziness, n/v, diaphoresis, orthopnea, PND, LE edema, syncope.  He underwent a stress echo 2 weeks ago which was positive for ischemia

## 2025-01-31 NOTE — ASU DISCHARGE PLAN (ADULT/PEDIATRIC) - CARE PROVIDER_API CALL
Severiano Khan  Interventional Cardiology  [address] [place] [id number]  Phone: [phone]  Fax: [phone]  Follow Up Time: 2 weeks

## 2025-01-31 NOTE — H&P CARDIOLOGY - RESPIRATORY AND THORAX
TELE RN NOTE



REFUSED ORAL MEDICATIONS. HEALTH TEACHING DONE. LAB RESULTS RELAYED TO DR. CAMPBELL AND DR. MORROW.   AWAITING ORDERS FROM DR. CAMPBELL. WILL CONTINUE TO MONITOR PATIENT. negative

## 2025-01-31 NOTE — H&P CARDIOLOGY - COMMENTS
73 y/o M with PMHx HLD, HTN, paroxysmal Afib (on Xarelto - last dose on 1/28/25, DM-II, CAD with prior miltiple PCIs, with most recent ones PCI/MARTHA ostial RPDA on 1/6/23 and PCI/MARTHA oCx 4/28/23, who initially presented to his cardiologist with c/o palpitations and exertional chest discomfort, and today had to come to the ER due to left sided chest pain awakening him from sleep.  Pt was started on Amiodarone in 11/2024. He underwent a stress echo 2 weeks ago which was positive for ischemia. Pt is now referred for [facility] with possible intervention if clinically indicated.

## 2025-02-13 DIAGNOSIS — Z79.4 LONG TERM (CURRENT) USE OF INSULIN: ICD-10-CM

## 2025-02-13 DIAGNOSIS — I48.0 PAROXYSMAL ATRIAL FIBRILLATION: ICD-10-CM

## 2025-02-13 DIAGNOSIS — R00.1 BRADYCARDIA, UNSPECIFIED: ICD-10-CM

## 2025-02-13 DIAGNOSIS — E11.9 TYPE 2 DIABETES MELLITUS WITHOUT COMPLICATIONS: ICD-10-CM

## 2025-02-13 DIAGNOSIS — I10 ESSENTIAL (PRIMARY) HYPERTENSION: ICD-10-CM

## 2025-02-13 DIAGNOSIS — Z95.5 PRESENCE OF CORONARY ANGIOPLASTY IMPLANT AND GRAFT: ICD-10-CM

## 2025-02-13 DIAGNOSIS — Z79.1 LONG TERM (CURRENT) USE OF NON-STEROIDAL ANTI-INFLAMMATORIES (NSAID): ICD-10-CM

## 2025-02-13 DIAGNOSIS — I25.110 ATHEROSCLEROTIC HEART DISEASE OF NATIVE CORONARY ARTERY WITH UNSTABLE ANGINA PECTORIS: ICD-10-CM

## 2025-02-13 DIAGNOSIS — Z79.82 LONG TERM (CURRENT) USE OF ASPIRIN: ICD-10-CM

## 2025-02-13 DIAGNOSIS — Z87.891 PERSONAL HISTORY OF NICOTINE DEPENDENCE: ICD-10-CM

## 2025-02-13 DIAGNOSIS — E78.5 HYPERLIPIDEMIA, UNSPECIFIED: ICD-10-CM

## 2025-02-13 DIAGNOSIS — Z86.16 PERSONAL HISTORY OF COVID-19: ICD-10-CM

## 2025-02-19 ENCOUNTER — APPOINTMENT (OUTPATIENT)
Dept: ELECTROPHYSIOLOGY | Facility: HOSPITAL | Age: 75
End: 2025-02-19

## 2025-06-16 ENCOUNTER — APPOINTMENT (OUTPATIENT)
Dept: ELECTROPHYSIOLOGY | Facility: CLINIC | Age: 75
End: 2025-06-16